# Patient Record
Sex: MALE | Race: WHITE | URBAN - METROPOLITAN AREA
[De-identification: names, ages, dates, MRNs, and addresses within clinical notes are randomized per-mention and may not be internally consistent; named-entity substitution may affect disease eponyms.]

---

## 2017-07-03 ENCOUNTER — TRANSFERRED RECORDS (OUTPATIENT)
Dept: HEALTH INFORMATION MANAGEMENT | Facility: CLINIC | Age: 57
End: 2017-07-03

## 2017-07-11 ENCOUNTER — TRANSFERRED RECORDS (OUTPATIENT)
Dept: HEALTH INFORMATION MANAGEMENT | Facility: CLINIC | Age: 57
End: 2017-07-11

## 2017-07-31 ENCOUNTER — MEDICAL CORRESPONDENCE (OUTPATIENT)
Dept: HEALTH INFORMATION MANAGEMENT | Facility: CLINIC | Age: 57
End: 2017-07-31

## 2017-07-31 NOTE — TELEPHONE ENCOUNTER
1.  Date/reason for appt: 17 - Lung Nodules  2.  Referring provider: Dr Frantz Sue  3.  Call to patient (Yes / No - short description): Yes, scheduled per Loree @ [facility] [phone]  4.  Previous care at / records requested from: [facility]  5.  Recent Imagin17 CT chest, 17 PFT - to be sent from [facility]

## 2017-08-01 NOTE — TELEPHONE ENCOUNTER
8/1/17 Received records from [facility] via fax - copy emailed to Meaghan & sent to scanning.

## 2017-08-25 ENCOUNTER — PRE VISIT (OUTPATIENT)
Dept: PULMONOLOGY | Facility: CLINIC | Age: 57
End: 2017-08-25

## 2017-08-25 ENCOUNTER — OFFICE VISIT (OUTPATIENT)
Dept: PULMONOLOGY | Facility: CLINIC | Age: 57
End: 2017-08-25
Attending: INTERNAL MEDICINE
Payer: COMMERCIAL

## 2017-08-25 VITALS
BODY MASS INDEX: 38.16 KG/M2 | TEMPERATURE: 98.1 F | OXYGEN SATURATION: 100 % | RESPIRATION RATE: 17 BRPM | HEART RATE: 54 BPM | SYSTOLIC BLOOD PRESSURE: 137 MMHG | WEIGHT: 251.8 LBS | DIASTOLIC BLOOD PRESSURE: 83 MMHG | HEIGHT: 68 IN

## 2017-08-25 DIAGNOSIS — R91.8 PULMONARY NODULES: Primary | ICD-10-CM

## 2017-08-25 PROCEDURE — 99212 OFFICE O/P EST SF 10 MIN: CPT | Mod: ZF

## 2017-08-25 ASSESSMENT — PAIN SCALES - GENERAL: PAINLEVEL: SEVERE PAIN (7)

## 2017-08-25 NOTE — MR AVS SNAPSHOT
After Visit Summary   8/25/2017    Deng Oconnell    MRN: [id number]           Patient Information     Date Of Birth          1960        Visit Information        Provider Department      8/25/2017 1:00 PM Jono Fried MD [facility]        Today's Diagnoses     Pulmonary nodules    -  1       Follow-ups after your visit        Follow-up notes from your care team     Return if symptoms worsen or fail to improve.      Your next 10 appointments already scheduled     Sep 22, 2017  4:30 PM CDT   FULL PULMONARY FUNCTION with  PFL D   [facility] Pulmonary Function Testing ([facility])    [address] [place] [address]  3rd [facility] [id number] [phone]            Dec 22, 2017  2:40 PM CST   (Arrive by 2:25 PM)   CT CHEST W/O CONTRAST with UCCT1   [facility] CT ([facility])    [address] [place] [id number] [phone]           Please bring any scans or X-rays taken at other hospitals, if similar tests were done. Also bring a list of your medicines, including vitamins, minerals and over-the-counter drugs. It is safest to leave personal items at home.  Be sure to tell your doctor:   If you have any allergies.   If there s any chance you are pregnant.   If you are breastfeeding.   If you have any special needs.  You do not need to do anything special to prepare.  Please wear loose clothing, such as a sweat suit or jogging clothes. Avoid snaps, zippers and other metal. We may ask you to undress and put on a hospital gown.            Dec 22, 2017  3:30 PM CST   (Arrive by 3:15 PM)   Return Visit with Jono Fried MD   [facility] ([facility])    [address] [place] [address]  2nd [facility] [id number] [phone]              Future tests that were ordered for you today     Open Future Orders        Priority Expected Expires Ordered    CT  "Chest w/o contrast Routine 11/15/2017 9/15/2018 9/15/2017    Pulmonary Function Test Routine  2018            Who to contact     If you have questions or need follow up information about today's clinic visit or your schedule please contact [facility] directly at [phone].  Normal or non-critical lab and imaging results will be communicated to you by MyChart, letter or phone within 4 business days after the clinic has received the results. If you do not hear from us within 7 days, please contact the clinic through [employer] or phone. If you have a critical or abnormal lab result, we will notify you by phone as soon as possible.  Submit refill requests through [employer] or call your pharmacy and they will forward the refill request to us. Please allow 3 business days for your refill to be completed.          Additional Information About Your Visit        [employer] Up Information     [employer] lets you send messages to your doctor, view your test results, renew your prescriptions, schedule appointments and more. To sign up, go to [email or web address] . Click on \"Log in\" on the left side of the screen, which will take you to the Welcome page. Then click on \"Sign up Now\" on the right side of the page.     You will be asked to enter the access code listed below, as well as some personal information. Please follow the directions to create your username and password.     Your access code is: [email or web address]  Expires: 10/29/2017  5:19 PM     Your access code will  in 90 days. If you need help or a new code, please call your [place] clinic or [phone].        Care EveryWhere ID     This is your Care EveryWhere ID. This could be used by other organizations to access your [place] medical records  [id number]        Your Vitals Were     Pulse Temperature Respirations Height Pulse Oximetry BMI (Body Mass Index)    54 98.1  F (36.7  C) (Oral) 17 1.727 m (5' 8\") 100% 38.29 kg/m2       Blood " Pressure from Last 3 Encounters:   09/14/17 142/65   08/25/17 137/83    Weight from Last 3 Encounters:   09/14/17 110.9 kg (244 lb 6.4 oz)   08/25/17 114.2 kg (251 lb 12.8 oz)               Primary Care Provider    Pcp Unknown Verified       No address on file        Equal Access to Services     AMANDA SHIN : Hadii parmjit shirley hadmoraleso Sowilmerali, waaxda luqadaha, qaybta kaalmada carmenkirstenkofi, sangeeta fuentes hayeverardo khourysueasael rodriguez . So [facility] [phone].    ATENCIÓN: Si habla español, tiene a reardon disposición servicios gratuitos de asistencia lingüística. Llame al [phone].    We comply with applicable federal civil rights laws and [place] laws. We do not discriminate on the basis of race, color, national origin, age, disability sex, sexual orientation or gender identity.            Thank you!     Thank you for choosing [facility]  for your care. Our goal is always to provide you with excellent care. Hearing back from our patients is one way we can continue to improve our services. Please take a few minutes to complete the written survey that you may receive in the mail after your visit with us. Thank you!             Your Updated Medication List - Protect others around you: Learn how to safely use, store and throw away your medicines at www.disposemymeds.org.          This list is accurate as of: 8/25/17 11:59 PM.  Always use your most recent med list.                   Brand Name Dispense Instructions for use Diagnosis    IBUPROFEN PO      Take by mouth as needed for moderate pain        METFORMIN HCL PO      Take 500 mg by mouth 2 times daily (with meals)

## 2017-08-25 NOTE — PROGRESS NOTES
[facility]   INTERVENTIONAL PULMONARY CLINIC     Deng Oconnell MRN# [id number]   Age: 57 year old YOB: 1960     Reason for Consultation:     RUL nodule  Requesting Physician:         [facility]          Assessment and Plan:    1. RUL nodule. Due to high risk for lung cancer, the report of 1.3cm RUL nodule is concerning for malignancy. We will ct chest today to evaluate. We will await for outside ct and pft.       Jono Fried MD  Interventional Pulmonary  Division of Pulmonary, Allergy, Critical Care and Sleep Medicine   [phone]            History:   Mr. Oconnell is a 57M with h/o COPD who is here for evaluation of RUL nodule.   - UNfortunately, his records/imaging were not sent to our system  - He had lung cancer screening CT in July demonstrating 1.3cm RUL nodule (no images seen)  - He has a >60pkyr tobacco history quit 2004   - He has dyspnea with exertion but never started on inhaler therapy for COPD   - He denies any new respiratory sx          Past Medical History:   COPD         Past Surgical History:    No past surgical history on file.         Social History:     Social History   Substance Use Topics     Smoking status: Former Smoker     Quit date: 8/25/2005     Smokeless tobacco: Never Used     Alcohol use Not on file            Family History:   No family history on file.          Allergies:   Please see allergy list which was reviewed this admission.         Medications:     .  Current Outpatient Prescriptions   Medication     METFORMIN HCL PO     IBUPROFEN PO     No current facility-administered medications for this visit.               Review of Systems:   CONSTITUTIONAL: negative for fever, chills, change in weight  INTEGUMENTARY/SKIN: no rash or obvious new lesions  ENT/MOUTH: no sore throat, new sinus pain or nasal drainage  RESP: see interval history  CV: negative for chest pain, palpitations or peripheral edema  GI: no nausea, vomiting, change in stools  : no  dysuria  MUSCULOSKELETAL: no myalgias, arthralgias  ENDOCRINE: blood sugars with adequate control  PSYCHIATRIC: mood stable  LYMPHATIC: no new lymphadenopathy  HEME: no bleeding or easy bruisability  NEURO: no numbness, weakness, headaches         Physical Exam:   Temp:  [98.1  F (36.7  C)] 98.1  F (36.7  C)  Pulse:  [54] 54  Resp:  [17] 17  BP: (137)/(83) 137/83  SpO2:  [100 %] 100 %    Wt Readings from Last 4 Encounters:   08/25/17 114.2 kg (251 lb 12.8 oz)       Constitutional:   Awake, alert and in no apparent distress     Eyes:   Nonicteric, RAJAN     ENT:    oral mucosa moist without lesions     Neck:   Supple without supraclavicular or cervical lymphadenopathy     Lungs:   Good air flow.  No crackles. No rhonchi.  No wheezes.     Cardiovascular:   Normal S1 and S2.  RRR.  No murmur, gallop or rub.  Radial, DP and PT pulses normal and symmetric     Abdomen:   NABS, soft, nontender, nondistended.  No HSM.     Musculoskeletal:   No edema. Strength 5/5 and symmetric     Neurologic:   Alert and conversant. Cranial nerves II-XII intact.       Skin:   Warm, dry.  No rash on limited exam.           Current Laboratory Data:   No data to review

## 2017-08-25 NOTE — LETTER
Date:August 28, 2017      Patient was self referred, no letter generated. Do not send.        [facility] Health Information

## 2017-08-25 NOTE — PROGRESS NOTES
CT chest demonstrated 1.3-1.4cm nodule in RUL.   - no sig mediastinal LAD   - plan to get PET scan and PFT's next wk and discuss in nodule conference next friday.

## 2017-08-29 NOTE — TELEPHONE ENCOUNTER
8/29/17 - Called referring clinic to check status of imaging disc. Per Юлия imaging disc was sent to us via [employer] this morning & should arrive tomorrow afternoon.    8/28/17 - Called referring clinic to confirm imaging disc has been sent to us, left voicemail for Loree ([phone]) & also on main line ([phone]).    8/25/17 - Called referring clinic & requested 2nd copy of imaging disc - left message on voicemail for Loree ([phone])       Also left message on main clinic voicemail ([phone]), and faxed request to [phone] & [phone].    8/25/17 - Called Jun in film room re: imaging disc/no uploaded imaging in system. Per Jun unable to upload imaging due to error on disc.

## 2017-08-30 NOTE — TELEPHONE ENCOUNTER
8/30/17 - received imaging disc from [employer] & delivered it to [facility] film room. Also received imaging report, copy sent to Meaghan via email & to HIM for scanning.

## 2017-09-01 ENCOUNTER — DOCUMENTATION ONLY (OUTPATIENT)
Dept: OTHER | Facility: CLINIC | Age: 57
End: 2017-09-01

## 2017-09-01 NOTE — PROGRESS NOTES
Called Mr. Oconnell today however could only leave a voicemail.   - I reported that the consensus of the nodule conference was to wait for PET and then discuss case at tumor conference next wk.     Jono Fried MD  Interventional Pulmonary  Division of Pulmonary, Allergy, Critical Care and Sleep Medicine   [phone]

## 2017-09-05 ENCOUNTER — HOSPITAL ENCOUNTER (OUTPATIENT)
Dept: PET IMAGING | Facility: CLINIC | Age: 57
Discharge: HOME | End: 2017-09-05
Attending: INTERNAL MEDICINE | Admitting: INTERNAL MEDICINE
Payer: COMMERCIAL

## 2017-09-05 ENCOUNTER — DOCUMENTATION ONLY (OUTPATIENT)
Dept: OTHER | Facility: CLINIC | Age: 57
End: 2017-09-05

## 2017-09-05 ENCOUNTER — HOSPITAL ENCOUNTER (OUTPATIENT)
Dept: PET IMAGING | Facility: CLINIC | Age: 57
End: 2017-09-05
Attending: INTERNAL MEDICINE
Payer: COMMERCIAL

## 2017-09-05 DIAGNOSIS — R91.8 PULMONARY NODULES: ICD-10-CM

## 2017-09-05 LAB — GLUCOSE BLDC GLUCOMTR-MCNC: 108 MG/DL (ref 70–99)

## 2017-09-05 PROCEDURE — 34300033 ZZH RX 343: Performed by: INTERNAL MEDICINE

## 2017-09-05 PROCEDURE — 25000128 H RX IP 250 OP 636: Performed by: INTERNAL MEDICINE

## 2017-09-05 PROCEDURE — 82962 GLUCOSE BLOOD TEST: CPT

## 2017-09-05 PROCEDURE — 71260 CT THORAX DX C+: CPT

## 2017-09-05 PROCEDURE — 74177 CT ABD & PELVIS W/CONTRAST: CPT

## 2017-09-05 PROCEDURE — 70491 CT SOFT TISSUE NECK W/DYE: CPT

## 2017-09-05 PROCEDURE — A9552 F18 FDG: HCPCS | Performed by: INTERNAL MEDICINE

## 2017-09-05 RX ORDER — IOPAMIDOL 755 MG/ML
60-135 INJECTION, SOLUTION INTRAVASCULAR ONCE
Status: COMPLETED | OUTPATIENT
Start: 2017-09-05 | End: 2017-09-05

## 2017-09-05 RX ADMIN — FLUDEOXYGLUCOSE F-18 15.9 MCI.: 500 INJECTION, SOLUTION INTRAVENOUS at 07:01

## 2017-09-05 RX ADMIN — IOPAMIDOL 135 ML: 755 INJECTION, SOLUTION INTRAVENOUS at 08:12

## 2017-09-05 NOTE — PROGRESS NOTES
Called Mr. Oconnell today however only able to leave a voicemail.   Discussed that the tumor conference consensus is to proceed to evaluation by thoracic surgery.     Jono Fried MD  Interventional Pulmonary  Division of Pulmonary, Allergy, Critical Care and Sleep Medicine   [phone]

## 2017-09-13 NOTE — PROGRESS NOTES
THORACIC SURGERY - NEW PATIENT OFFICE VISIT      Dear Dr. Fried,    I saw Mr. Oconnell at Dr. Fried s request in consultation for the evaluation and treatment of a RIGHT upper lobe spiculated nodule.     HPI  Mr. Deng Oconnell is a 57 year old year-old male who was found to have a 1.4cm RIGHT upper lobe spiculated nodule on a screening CT chest due to his history of smoking. He denies any respiratory complaints, no chest pain, SOB, cough or hemoptysis.    Previsit Tests   PET-CT 9/5/17: 1.1cm RIGHT upper lobe nodule with mild FDG uptake (2.5), bilateral adrenal thickening with FDG uptake (6), possibly hyperplasia          PFT 7/3/2017: FEV1: 3.36  PMH  DM    No past medical history on file.     PSH  Knee surgery    No past surgical history on file.     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 He is single and has no children. His sister lives nearby in [place]    IMPRESSION (R91.1) Lung nodule  (primary encounter diagnosis)       57 year old year-old male with RIGHT upper lobe solitary pulmonary nodule    PLAN  I spent a total of 30 minutes with Mr. Oconnell, more than 50% of which were spent in counseling, coordination of care, and face-to-face time. I reviewed the plan as follows:  Discussed with the patient and his sister who was on the phone regarding the options of surgical biopsy vs. Waiting.  Surgical biopsy would involve a VATS possible thoracotomy with a wedge and potentially a lobectomy given the location of the lesion. We went over the possibility of a lobectomy for a benign diagnosis. Patient is skeptical about this.  We also went over the possibility of a short term follow up but with the associated risk of leaving a cancer under observation.  He is open to the idea of surgery. However, we will discuss him at our nodule conference to come to a  rich on proceeding with surgery  All questions were answered and Deng Oconnell and present family were in agreement with the plan.  I appreciate the opportunity to participate in the care of your patient and will keep you updated.  Sincerely,

## 2017-09-14 ENCOUNTER — OFFICE VISIT (OUTPATIENT)
Dept: SURGERY | Facility: CLINIC | Age: 57
End: 2017-09-14
Attending: INTERNAL MEDICINE
Payer: COMMERCIAL

## 2017-09-14 VITALS
HEART RATE: 67 BPM | BODY MASS INDEX: 37.04 KG/M2 | HEIGHT: 68 IN | SYSTOLIC BLOOD PRESSURE: 142 MMHG | TEMPERATURE: 98.3 F | RESPIRATION RATE: 17 BRPM | WEIGHT: 244.4 LBS | OXYGEN SATURATION: 95 % | DIASTOLIC BLOOD PRESSURE: 65 MMHG

## 2017-09-14 DIAGNOSIS — R91.1 LUNG NODULE: Primary | ICD-10-CM

## 2017-09-14 PROCEDURE — 99212 OFFICE O/P EST SF 10 MIN: CPT | Mod: ZF

## 2017-09-14 PROCEDURE — 99213 OFFICE O/P EST LOW 20 MIN: CPT

## 2017-09-14 ASSESSMENT — PAIN SCALES - GENERAL: PAINLEVEL: NO PAIN (0)

## 2017-09-14 NOTE — LETTER
9/14/2017       RE: Deng Oconnell  [address] [facility] [address]     Dear Colleague,    Thank you for referring your patient, Deng Oconnell, to the [facility]. Please see a copy of my visit note below.    THORACIC SURGERY - NEW PATIENT OFFICE VISIT      Dear Dr. Fried,    I saw Mr. Oconnell at Dr. Fried s request in consultation for the evaluation and treatment of a RIGHT upper lobe spiculated nodule.     HPI  Mr. Deng Oconnell is a 57 year old year-old male who was found to have a 1.4cm RIGHT upper lobe spiculated nodule on a screening CT chest due to his history of smoking. He denies any respiratory complaints, no chest pain, SOB, cough or hemoptysis.    Previsit Tests   PET-CT 9/5/17: 1.1cm RIGHT upper lobe nodule with mild FDG uptake (2.5), bilateral adrenal thickening with FDG uptake (6), possibly hyperplasia          PFT 7/3/2017: FEV1: 3.36  PMH  DM    No past medical history on file.     PSH  Knee surgery    No past surgical history on file.     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 He is single and has no children. His sister lives nearby in [place]    IMPRESSION (R91.1) Lung nodule  (primary encounter diagnosis)       57 year old year-old male with RIGHT upper lobe solitary pulmonary nodule    PLAN  I spent a total of 30 minutes with Mr. Oconnell, more than 50% of which were spent in counseling, coordination of care, and face-to-face time. I reviewed the plan as follows:  Discussed with the patient and his sister who was on the phone regarding the options of surgical biopsy vs. Waiting.  Surgical biopsy would involve a VATS possible thoracotomy with a wedge and potentially a lobectomy given the location of the lesion. We went over the possibility of a lobectomy for a benign diagnosis. Patient is skeptical about this.  We also  went over the possibility of a short term follow up but with the associated risk of leaving a cancer under observation.  He is open to the idea of surgery. However, we will discuss him at our nodule conference to come to a concensus on proceeding with surgery  All questions were answered and Deng Oconnell and present family were in agreement with the plan.  I appreciate the opportunity to participate in the care of your patient and will keep you updated.  Sincerely,        Estefany Main MD

## 2017-09-14 NOTE — NURSING NOTE
"Oncology Rooming Note    September 14, 2017 9:18 AM   Deng Oconnell is a 57 year old male who presents for:    Chief Complaint   Patient presents with     Oncology Clinic Visit     new patient visit for consultation related to RUL spiculated nodule     Initial Vitals: /65  Pulse 67  Temp 98.3  F (36.8  C) (Oral)  Resp 17  Ht 1.727 m (5' 8\")  Wt 110.9 kg (244 lb 6.4 oz)  SpO2 95%  BMI 37.16 kg/m2 Estimated body mass index is 37.16 kg/(m^2) as calculated from the following:    Height as of this encounter: 1.727 m (5' 8\").    Weight as of this encounter: 110.9 kg (244 lb 6.4 oz). Body surface area is 2.31 meters squared.  No Pain (0) Comment: Data Unavailable   No LMP for male patient.  Allergies reviewed: Yes  Medications reviewed: Yes    Medications: Medication refills not needed today.  Pharmacy name entered into EPIC: Data Unavailable    Clinical concerns: no concerns dr. wolfe was notified.    8 minutes for nursing intake (face to face time)     Magno Manjarrez CMA                "

## 2017-09-14 NOTE — MR AVS SNAPSHOT
"              After Visit Summary   9/14/2017    Deng Oconnell    MRN: [id number]           Patient Information     Date Of Birth          1960        Visit Information        Provider Department      9/14/2017 9:30 AM Estefany Main MD [facility]        Today's Diagnoses     Lung nodule    -  1       Follow-ups after your visit        Your next 10 appointments already scheduled     Sep 22, 2017  4:30 PM CDT   FULL PULMONARY FUNCTION with UC PFL D   [facility] Pulmonary Function Testing ([facility])    [address] [facility]  3rd [facility] [address] [phone]              Future tests that were ordered for you today     Open Future Orders        Priority Expected Expires Ordered    Pulmonary Function Test Routine  9/14/2018 9/14/2017            Who to contact     If you have questions or need follow up information about today's clinic visit or your schedule please contact [facility] directly at [phone].  Normal or non-critical lab and imaging results will be communicated to you by [employer], letter or phone within 4 business days after the clinic has received the results. If you do not hear from us within 7 days, please contact the clinic through [employer] or phone. If you have a critical or abnormal lab result, we will notify you by phone as soon as possible.  Submit refill requests through [employer] or call your pharmacy and they will forward the refill request to us. Please allow 3 business days for your refill to be completed.          Additional Information About Your Visit        [employer] Information     [employer] lets you send messages to your doctor, view your test results, renew your prescriptions, schedule appointments and more. To sign up, go to [email or web address] . Click on \"Log in\" on the left side of the screen, which will take you to the Welcome page. Then click on \"Sign up Now\" on the right side of the page.     You " "will be asked to enter the access code listed below, as well as some personal information. Please follow the directions to create your username and password.     Your access code is: [email or web address]  Expires: 10/29/2017  5:19 PM     Your access code will  in 90 days. If you need help or a new code, please call your [facility] or [phone].        Care EveryWhere ID     This is your Care EveryWhere ID. This could be used by other organizations to access your [facility] medical records  [id number]        Your Vitals Were     Pulse Temperature Respirations Height Pulse Oximetry BMI (Body Mass Index)    67 98.3  F (36.8  C) (Oral) 17 1.727 m (5' 8\") 95% 37.16 kg/m2       Blood Pressure from Last 3 Encounters:   17 142/65   17 137/83    Weight from Last 3 Encounters:   17 110.9 kg (244 lb 6.4 oz)   17 114.2 kg (251 lb 12.8 oz)               Primary Care Provider    Pcp Unknown Verified       No address on file        Equal Access to Services     [facility] : Hadii parmjit lopez Socamden, waaxda lusarabjit, qaybta kaalcaitlyn barry, sangeeta kerr. So [facility] [phone].    ATENCIÓN: Si habla español, tiene a reardon disposición servicios gratuitos de asistencia lingüística. Ll[facility] [phone].    We comply with applicable federal civil rights laws and [place] laws. We do not discriminate on the basis of race, color, national origin, age, disability sex, sexual orientation or gender identity.            Thank you!     Thank you for choosing [facility]  for your care. Our goal is always to provide you with excellent care. Hearing back from our patients is one way we can continue to improve our services. Please take a few minutes to complete the written survey that you may receive in the mail after your visit with us. Thank you!             Your Updated Medication List - Protect others around you: Learn how to safely use, store and throw away " your medicines at www.disposemymeds.org.          This list is accurate as of: 9/14/17 10:46 AM.  Always use your most recent med list.                   Brand Name Dispense Instructions for use Diagnosis    IBUPROFEN PO      Take by mouth as needed for moderate pain        METFORMIN HCL PO      Take 500 mg by mouth 2 times daily (with meals)

## 2017-09-15 ENCOUNTER — DOCUMENTATION ONLY (OUTPATIENT)
Dept: OTHER | Facility: CLINIC | Age: 57
End: 2017-09-15

## 2017-09-15 ENCOUNTER — TELEPHONE (OUTPATIENT)
Dept: SURGERY | Facility: CLINIC | Age: 57
End: 2017-09-15

## 2017-09-15 DIAGNOSIS — R91.8 PULMONARY NODULES: Primary | ICD-10-CM

## 2017-09-15 DIAGNOSIS — R91.1 LUNG NODULE: Primary | ICD-10-CM

## 2017-09-15 NOTE — PROGRESS NOTES
Called  Kourtney today and left message.   - Essentially thoracic surgery prefers to perform another short-term CT in 3months to re-evaluate nodule.   - The consensus is in agreement.   - He will get 3mo f/u with me in nodule clinic with ct same day.    Jono Fried MD  Interventional Pulmonary  Division of Pulmonary, Allergy, Critical Care and Sleep Medicine   [phone]

## 2017-09-15 NOTE — TELEPHONE ENCOUNTER
Call to Deng to discuss the recommendations from this morning's conference. There was no answer. Message left with call back information.

## 2018-01-08 ENCOUNTER — OFFICE VISIT (OUTPATIENT)
Dept: PULMONOLOGY | Facility: CLINIC | Age: 58
End: 2018-01-08
Attending: INTERNAL MEDICINE
Payer: COMMERCIAL

## 2018-01-08 ENCOUNTER — RADIANT APPOINTMENT (OUTPATIENT)
Dept: CT IMAGING | Facility: CLINIC | Age: 58
End: 2018-01-08
Attending: INTERNAL MEDICINE
Payer: COMMERCIAL

## 2018-01-08 VITALS
WEIGHT: 233.03 LBS | TEMPERATURE: 97.2 F | DIASTOLIC BLOOD PRESSURE: 75 MMHG | OXYGEN SATURATION: 95 % | BODY MASS INDEX: 35.43 KG/M2 | RESPIRATION RATE: 16 BRPM | SYSTOLIC BLOOD PRESSURE: 128 MMHG | HEART RATE: 71 BPM

## 2018-01-08 DIAGNOSIS — R91.8 PULMONARY NODULES: Primary | ICD-10-CM

## 2018-01-08 DIAGNOSIS — R91.8 PULMONARY NODULES: ICD-10-CM

## 2018-01-08 RX ORDER — METOPROLOL TARTRATE 25 MG/1
25 TABLET, FILM COATED ORAL DAILY
COMMUNITY
End: 2018-02-22

## 2018-01-08 RX ORDER — METOPROLOL SUCCINATE 25 MG/1
25 TABLET, EXTENDED RELEASE ORAL EVERY MORNING
COMMUNITY
Start: 2017-11-13

## 2018-01-08 ASSESSMENT — PAIN SCALES - GENERAL: PAINLEVEL: NO PAIN (0)

## 2018-01-08 NOTE — MR AVS SNAPSHOT
"              After Visit Summary   2018    Deng Oconnell    MRN: [id number]           Patient Information     Date Of Birth          1960        Visit Information        Provider Department      2018 4:30 PM Jono Fried MD [facility]        Today's Diagnoses     Pulmonary nodules    -  1       Follow-ups after your visit        Follow-up notes from your care team     Return if symptoms worsen or fail to improve.      Who to contact     If you have questions or need follow up information about today's clinic visit or your schedule please contact [facility] directly at [phone].  Normal or non-critical lab and imaging results will be communicated to you by MyChart, letter or phone within 4 business days after the clinic has received the results. If you do not hear from us within 7 days, please contact the clinic through eyesFinderhart or phone. If you have a critical or abnormal lab result, we will notify you by phone as soon as possible.  Submit refill requests through [facility] or call your pharmacy and they will forward the refill request to us. Please allow 3 business days for your refill to be completed.          Additional Information About Your Visit        MyChart Information     [facility]ita lets you send messages to your doctor, view your test results, renew your prescriptions, schedule appointments and more. To sign up, go to [email or web address] . Click on \"Log in\" on the left side of the screen, which will take you to the Welcome page. Then click on \"Sign up Now\" on the right side of the page.     You will be asked to enter the access code listed below, as well as some personal information. Please follow the directions to create your username and password.     Your access code is: [email or web address]  Expires: 2018 11:12 AM     Your access code will  in 90 days. If you need help or a new code, please call your [place] clinic or [phone].        Care " EveryWhere ID     This is your Care EveryWhere ID. This could be used by other organizations to access your [place] medical records  [id number]        Your Vitals Were     Pulse Temperature Respirations Pulse Oximetry BMI (Body Mass Index)       71 97.2  F (36.2  C) (Oral) 16 95% 35.43 kg/m2        Blood Pressure from Last 3 Encounters:   01/08/18 128/75   09/14/17 142/65   08/25/17 137/83    Weight from Last 3 Encounters:   01/08/18 105.7 kg (233 lb 0.4 oz)   09/14/17 110.9 kg (244 lb 6.4 oz)   08/25/17 114.2 kg (251 lb 12.8 oz)              Today, you had the following     No orders found for display         Today's Medication Changes          These changes are accurate as of: 1/8/18  7:08 PM.  If you have any questions, ask your nurse or doctor.               Stop taking these medicines if you haven't already. Please contact your care team if you have questions.     METFORMIN HCL PO   Stopped by:  Jono Fried MD                    Primary Care Provider Fax #    Physician No Ref-Primary [phone]       No address on file        Equal Access to Services     [facility]: Hadrodolfo Gmable, elisa cueto, qahawa barry, sangeeta rodriguez . So [facility] [phone].    ATENCIÓN: Si habla español, tiene a reardon disposición servicios gratuitos de asistencia lingüística. [facility] [phone].    We comply with applicable federal civil rights laws and [place] laws. We do not discriminate on the basis of race, color, national origin, age, disability, sex, sexual orientation, or gender identity.            Thank you!     Thank you for choosing [facility]  for your care. Our goal is always to provide you with excellent care. Hearing back from our patients is one way we can continue to improve our services. Please take a few minutes to complete the written survey that you may receive in the mail after your visit with us. Thank you!             Your  Updated Medication List - Protect others around you: Learn how to safely use, store and throw away your medicines at www.disposemymeds.org.          This list is accurate as of: 1/8/18  7:08 PM.  Always use your most recent med list.                   Brand Name Dispense Instructions for use Diagnosis    ASPIRIN PO      Take 81 mg by mouth daily        metoprolol 25 MG 24 hr tablet    TOPROL-XL     Take 25 mg by mouth        metoprolol 25 MG tablet    LOPRESSOR     Take 25 mg by mouth daily

## 2018-01-08 NOTE — NURSING NOTE
"Oncology Rooming Note    January 8, 2018 3:30 PM   Deng Oconnell is a 57 year old male who presents for:    No chief complaint on file.    Initial Vitals: /75  Pulse 71  Temp 97.2  F (36.2  C) (Oral)  Resp 16  Wt 105.7 kg (233 lb 0.4 oz)  SpO2 95%  BMI 35.43 kg/m2 Estimated body mass index is 35.43 kg/(m^2) as calculated from the following:    Height as of 9/14/17: 1.727 m (5' 8\").    Weight as of this encounter: 105.7 kg (233 lb 0.4 oz). Body surface area is 2.25 meters squared.  No Pain (0) Comment: Data Unavailable   No LMP for male patient.  Allergies reviewed: Yes  Medications reviewed: Yes    Medications: Medication refills not needed today.  Pharmacy name entered into EPIC: Data Unavailable    Clinical concerns: results  Cho was notified.    7  minutes for nursing intake (face to face time)     Anaya Shabazz MA                "

## 2018-01-08 NOTE — LETTER
1/8/2018       RE: Deng Oconnell  [address] [address]     Dear Colleague,    Thank you for referring your patient, Deng Oconnell, to the [facility]. Please see a copy of my visit note below.    [facility]   PULMONARY/INTERVENTIONAL CLINIC  [facility] SURGERY [place]     Deng Oconnell MRN# [id number]   Age: 57 year old YOB: 1960     Requesting Physician:         Provider Not In System     Assessment and Plan:    1. RUL nodule. Although this is indeterminate; the probability that this is malignancy is >90% given size, pretest probability and SUV>3.5. I will discuss this case with thoracic surgery at lung nodule conference this Friday and call him with the consensus. He is agreeable.    I spent more than 30 minutes face to face and greater than 50% of time was for counseling and coordination of care about the issues above.    Jono Fried MD  Interventional Pulmonary  Division of Pulmonary, Allergy, Critical Care and Sleep Medicine   [phone]            History:   Mr. Oconnell is a 57M with right lung nodule back for f/u.   - No new respiratory sx. Quit tobacco ~15yrs ago.   - Has a 1.1 cm RUL nodule that has been w/u with PET and discussed at nodule conference. The SUV is >3.5. Recently saw thoracic surgery and given that this would be an extensive procedure, plan was to f/u another short-term CT.   - He denies CP, dyspnea, hemoptysis.            Past Medical History:   Lung nodule         Past Surgical History:    No past surgical history on file.         Social History:     Social History   Substance Use Topics     Smoking status: Former Smoker     Quit date: 8/25/2005     Smokeless tobacco: Never Used     Alcohol use Not on file            Family History:   No family history on file.          Allergies:   Please see allergy list which was reviewed this admission.         Medications:     Current  Outpatient Prescriptions   Medication     metoprolol (LOPRESSOR) 25 MG tablet     ASPIRIN PO     metoprolol (TOPROL-XL) 25 MG 24 hr tablet     No current facility-administered medications for this visit.           Review of Systems:   CONSTITUTIONAL: negative for fever, chi, change in weight  INTEGUMENTARY/SKIN: no rash or obvious new lesions  ENT/MOUTH: no sore throat, new sinus pain or nasal drainage  RESP: see interval history  CV: negative for chest pain, palpitations or peripheral edema  GI: no nausea, vomiting, change in stools  : no dysuria  MUSCULOSKELETAL: no myalgias, arthralgias  ENDOCRINE: blood sugars with adequate control  PSYCHIATRIC: mood stable  LYMPHATIC: no new lymphadenopathy  HEME: no bleeding or easy bruisability  NEURO: no numbness, weakness, headaches         Physical Exam:   Temp:  [97.2  F (36.2  C)] 97.2  F (36.2  C)  Pulse:  [71] 71  Resp:  [16] 16  BP: (128)/(75) 128/75  SpO2:  [95 %] 95 %  [unfilled]  Wt Readings from Last 4 Encounters:   01/08/18 105.7 kg (233 lb 0.4 oz)   09/14/17 110.9 kg (244 lb 6.4 oz)   08/25/17 114.2 kg (251 lb 12.8 oz)       Constitutional:   Awake, alert and in no apparent distress     Eyes:   Nonicteric, RAJAN     ENT:    oral mucosa moist without lesions     Neck:   Supple without supraclavicular or cervical lymphadenopathy     Lungs:   Good air flow.  No crackles. No rhonchi.  No wheezes.     Cardiovascular:   Normal S1 and S2.  RRR.  No murmur, gallop or rub.  Radial, DP and PT pulses normal and symmetric     Abdomen:   NABS, soft, nontender, nondistended.  No HSM.     Musculoskeletal:   No edema. Strength 5/5 and symmetric     Neurologic:   Alert and conversant. Cranial nerves II-XII intact.       Skin:   Warm, dry.  No rash on limited exam.           Current Laboratory Data:   All laboratory and imaging data reviewed.    Results for orders placed or performed in visit on 01/08/18 (from the past 24 hour(s))   CT Chest w/o contrast    Narrative    Exam: CT  chest with IV contrast 1/8/2018 3:35 PM    History: Nodule; Pulmonary nodules    Comparison: 9/5/2017, 7/11/2017    Technique: Helical CT imaging from the thoracic inlet through the  diaphragms without IV contrast. Multiplanar reconstructions including  axial and coronal. Images were reviewed in bone, soft tissue, and lung  windows.     Findings:    Chest:  The thyroid parenchyma, heart size, pericardium, and esophagus are  unremarkable. Common origin of the right brachiocephalic and left  common carotid arteries. The ascending aorta and main pulmonary artery  are not enlarged. No lymphadenopathy in the chest.    The central tracheal bronchial tree is patent. No pneumothorax,  pleural effusion, or consolidation. Stable 1.1 cm right upper lobe  pulmonary nodule (series 4, image 129). Stable 2 mm nodule along the  left fissure (series 4, image 143). Scattered calcified granulomas. No  new pulmonary nodules.    Upper abdomen:  Tiny splenule anterior to the spleen. The upper abdomen is otherwise  unremarkable.    Bones:  No acute or worrisome osseous lesions.        Impression    Impression:   Stable 1.1 cm right upper lobe pulmonary nodule, unchanged since  7/11/2017. No new pulmonary nodules. Consider follow up in 6 months  with low dose chest CT.    I have personally reviewed the examination and initial interpretation  and I agree with the findings.    CALE BATRES MD             Previous Chest Imaging   Exam: CT chest with IV contrast 1/8/2018 3:35 PM     History: Nodule; Pulmonary nodules     Comparison: 9/5/2017, 7/11/2017     Technique: Helical CT imaging from the thoracic inlet through the  diaphragms without IV contrast. Multiplanar reconstructions including  axial and coronal. Images were reviewed in bone, soft tissue, and lung  windows.      Findings:     Chest:  The thyroid parenchyma, heart size, pericardium, and esophagus are  unremarkable. Common origin of the right brachiocephalic and left  common  carotid arteries. The ascending aorta and main pulmonary artery  are not enlarged. No lymphadenopathy in the chest.     The central tracheal bronchial tree is patent. No pneumothorax,  pleural effusion, or consolidation. Stable 1.1 cm right upper lobe  pulmonary nodule (series 4, image 129). Stable 2 mm nodule along the  left fissure (series 4, image 143). Scattered calcified granulomas. No  new pulmonary nodules.     Upper abdomen:  Tiny splenule anterior to the spleen. The upper abdomen is otherwise  unremarkable.     Bones:  No acute or worrisome osseous lesions.            Impression:   Stable 1.1 cm right upper lobe pulmonary nodule, unchanged since  7/11/2017. No new pulmonary nodules. Consider follow up in 6 months  with low dose chest CT.     I have personally reviewed the examination and initial interpretation  and I agree with the findings.     CALE BATRES MD               Again, thank you for allowing me to participate in the care of your patient.      Sincerely,    Jono Fried MD

## 2018-01-08 NOTE — LETTER
Date:January 9, 2018      Patient was self referred, no letter generated. Do not send.        [facility] Health Information

## 2018-01-09 NOTE — PROGRESS NOTES
-[facility]   PULMONARY/INTERVENTIONAL CLINIC  [facility] & SURGERY [place]     Deng Oconnell MRN# [id number]   Age: 57 year old YOB: 1960     Requesting Physician:         Provider Not In System     Assessment and Plan:    1. RUL nodule. Although this is indeterminate; the probability that this is malignancy is >90% given size, pretest probability and SUV>2.5. I will discuss this case with thoracic surgery at lung nodule conference this Friday and call him with the consensus. He is agreeable.    I spent more than 30 minutes face to face and greater than 50% of time was for counseling and coordination of care about the issues above.    Jono Fried MD  Interventional Pulmonary  Division of Pulmonary, Allergy, Critical Care and Sleep Medicine   [phone]            History:   Mr. Oconnell is a 57M with right lung nodule back for f/u.   - No new respiratory sx. Quit tobacco ~15yrs ago.   - Has a 1.1 cm RUL nodule that has been w/u with PET and discussed at nodule conference. The SUV is >3.5. Recently saw thoracic surgery and given that this would be an extensive procedure, plan was to f/u another short-term CT.   - He denies CP, dyspnea, hemoptysis.            Past Medical History:   Lung nodule         Past Surgical History:    No past surgical history on file.         Social History:     Social History   Substance Use Topics     Smoking status: Former Smoker     Quit date: 8/25/2005     Smokeless tobacco: Never Used     Alcohol use Not on file            Family History:   No family history on file.          Allergies:   Please see allergy list which was reviewed this admission.         Medications:     Current Outpatient Prescriptions   Medication     metoprolol (LOPRESSOR) 25 MG tablet     ASPIRIN PO     metoprolol (TOPROL-XL) 25 MG 24 hr tablet     No current facility-administered medications for this visit.           Review of Systems:   CONSTITUTIONAL: negative for fever, chi,  change in weight  INTEGUMENTARY/SKIN: no rash or obvious new lesions  ENT/MOUTH: no sore throat, new sinus pain or nasal drainage  RESP: see interval history  CV: negative for chest pain, palpitations or peripheral edema  GI: no nausea, vomiting, change in stools  : no dysuria  MUSCULOSKELETAL: no myalgias, arthralgias  ENDOCRINE: blood sugars with adequate control  PSYCHIATRIC: mood stable  LYMPHATIC: no new lymphadenopathy  HEME: no bleeding or easy bruisability  NEURO: no numbness, weakness, headaches         Physical Exam:   Temp:  [97.2  F (36.2  C)] 97.2  F (36.2  C)  Pulse:  [71] 71  Resp:  [16] 16  BP: (128)/(75) 128/75  SpO2:  [95 %] 95 %  [unfilled]  Wt Readings from Last 4 Encounters:   01/08/18 105.7 kg (233 lb 0.4 oz)   09/14/17 110.9 kg (244 lb 6.4 oz)   08/25/17 114.2 kg (251 lb 12.8 oz)       Constitutional:   Awake, alert and in no apparent distress     Eyes:   Nonicteric, RAJAN     ENT:    oral mucosa moist without lesions     Neck:   Supple without supraclavicular or cervical lymphadenopathy     Lungs:   Good air flow.  No crackles. No rhonchi.  No wheezes.     Cardiovascular:   Normal S1 and S2.  RRR.  No murmur, gallop or rub.  Radial, DP and PT pulses normal and symmetric     Abdomen:   NABS, soft, nontender, nondistended.  No HSM.     Musculoskeletal:   No edema. Strength 5/5 and symmetric     Neurologic:   Alert and conversant. Cranial nerves II-XII intact.       Skin:   Warm, dry.  No rash on limited exam.           Current Laboratory Data:   All laboratory and imaging data reviewed.    Results for orders placed or performed in visit on 01/08/18 (from the past 24 hour(s))   CT Chest w/o contrast    Narrative    Exam: CT chest with IV contrast 1/8/2018 3:35 PM    History: Nodule; Pulmonary nodules    Comparison: 9/5/2017, 7/11/2017    Technique: Helical CT imaging from the thoracic inlet through the  diaphragms without IV contrast. Multiplanar reconstructions including  axial and coronal.  Images were reviewed in bone, soft tissue, and lung  windows.     Findings:    Chest:  The thyroid parenchyma, heart size, pericardium, and esophagus are  unremarkable. Common origin of the right brachiocephalic and left  common carotid arteries. The ascending aorta and main pulmonary artery  are not enlarged. No lymphadenopathy in the chest.    The central tracheal bronchial tree is patent. No pneumothorax,  pleural effusion, or consolidation. Stable 1.1 cm right upper lobe  pulmonary nodule (series 4, image 129). Stable 2 mm nodule along the  left fissure (series 4, image 143). Scattered calcified granulomas. No  new pulmonary nodules.    Upper abdomen:  Tiny splenule anterior to the spleen. The upper abdomen is otherwise  unremarkable.    Bones:  No acute or worrisome osseous lesions.        Impression    Impression:   Stable 1.1 cm right upper lobe pulmonary nodule, unchanged since  7/11/2017. No new pulmonary nodules. Consider follow up in 6 months  with low dose chest CT.    I have personally reviewed the examination and initial interpretation  and I agree with the findings.    CALE BATRES MD             Previous Chest Imaging   Exam: CT chest with IV contrast 1/8/2018 3:35 PM     History: Nodule; Pulmonary nodules     Comparison: 9/5/2017, 7/11/2017     Technique: Helical CT imaging from the thoracic inlet through the  diaphragms without IV contrast. Multiplanar reconstructions including  axial and coronal. Images were reviewed in bone, soft tissue, and lung  windows.      Findings:     Chest:  The thyroid parenchyma, heart size, pericardium, and esophagus are  unremarkable. Common origin of the right brachiocephalic and left  common carotid arteries. The ascending aorta and main pulmonary artery  are not enlarged. No lymphadenopathy in the chest.     The central tracheal bronchial tree is patent. No pneumothorax,  pleural effusion, or consolidation. Stable 1.1 cm right upper lobe  pulmonary nodule (series  4, image 129). Stable 2 mm nodule along the  left fissure (series 4, image 143). Scattered calcified granulomas. No  new pulmonary nodules.     Upper abdomen:  Tiny splenule anterior to the spleen. The upper abdomen is otherwise  unremarkable.     Bones:  No acute or worrisome osseous lesions.            Impression:   Stable 1.1 cm right upper lobe pulmonary nodule, unchanged since  7/11/2017. No new pulmonary nodules. Consider follow up in 6 months  with low dose chest CT.     I have personally reviewed the examination and initial interpretation  and I agree with the findings.     CALE BATRES MD

## 2018-01-12 ENCOUNTER — DOCUMENTATION ONLY (OUTPATIENT)
Dept: OTHER | Facility: CLINIC | Age: 58
End: 2018-01-12

## 2018-01-12 DIAGNOSIS — R91.1 LUNG NODULE: Primary | ICD-10-CM

## 2018-01-17 ENCOUNTER — HOSPITAL ENCOUNTER (INPATIENT)
Facility: CLINIC | Age: 58
Setting detail: SURGERY ADMIT
End: 2018-01-17
Attending: STUDENT IN AN ORGANIZED HEALTH CARE EDUCATION/TRAINING PROGRAM | Admitting: STUDENT IN AN ORGANIZED HEALTH CARE EDUCATION/TRAINING PROGRAM
Payer: COMMERCIAL

## 2018-02-20 ENCOUNTER — TELEPHONE (OUTPATIENT)
Dept: ONCOLOGY | Facility: CLINIC | Age: 58
End: 2018-02-20

## 2018-02-20 NOTE — TELEPHONE ENCOUNTER
Oncology Distress Screening Follow-up  Clinical Social Work  [facility]    Identified Concern and Score From Distress Screening: If you want to be contacted by one of our professionals, I can send a message to them right now. : Oncology Social Worker    Date of Distress Screenin18    Data: Pt is a 57 yr old male with lung nodule/pulmonary nodule.     Intervention: Spoke with pt as he requested call from .  Pt states no current concerns or issues.  Pt states having questions about upcoming surgery but aware that these will be addressed Thursday at his appt.  Main question being how long he will be out of work after surgery/procedure.     Education Provided: n/a    Follow-up Required: SW available as necessary.       ROCHELLE Courtney, MSW   - [facility]  Phone: [phone]  Pager: [phone]  2018

## 2018-02-21 NOTE — PROGRESS NOTES
THORACIC SURGERY FOLLOW UP VISIT    Dear Dr. Fried,  I saw Mr. Deng Oconnell in follow-up today. 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 Oconnell is a 57 year old male with a RUL nodule that has been followed by Dr. Fried. He has previously been seen in September for this nodule. Because of the location of the nodule he would like require a lobectomy. At that time follow up was planned due to the risk of performing a lobectomy for a benign diagnoses. He was followed with a PET CT which showed the SUV of the nodule was 2.63  He was rediscussed at nodule conference and a decision was made to proceed with surgical resection    From a personal perspective, he is a , lives alone in [place] [place] [facility]. He is single and has no children. His sister lives nearby in [facility] who joined by telephone.    IMPRESSION (R91.1) Lung nodule  (primary encounter diagnosis)  57 year-old male with a RUL solitary nodule    PLAN  I spent a total of 30 minutes with Mr. Deng Oconnell and his sister Marjan (on the phone), more than 50% of which were spent in counseling, coordination of care, and face-to-face time. I reviewed the plan as follows:  Procedure planned: RIGHT  VATS segmentectomy, possible lobectomy and mediastinal node dissection, with possible  thoracotomy.  I reviewed the indications, risks, and benefits of the procedure with Mr. Deng Oconnell. We discussed the intraoperative risks of bleeding and injury to vital organs, potential postoperative complications including, but not limited to, major respiratory events, arrhythmia, bleeding, infection, reoperation, and death. I explained the anticipated hospital course (+/-  3-5 days) and postoperative recovery including pain control, chest drain management, and variable  degrees of dyspnea (or need for supplemental oxygen) and fatigue that tend to get better with time.  We discussed in detail that this could well be a benign lesion and that we could potentially end up with a lobectomy given the difficult location of the nodule. I explained to him what a lobectomy would entail and the increased risk profile with a lobectomy as opposed to a wedge or segmentectomy. He understands this risk and still wishes to proceed  Necessary Tests & Appointments: PAC completed  Pain Control Plan: Liposomal Bupivicaine  Anticoagulation Plan: Lovenox  Smoking Cessation: N/A  All questions were answered and the patient and present family were in agreement with the plan.  I appreciate the opportunity to participate in the care of your patient and will keep you updated.  Sincerely,

## 2018-02-22 ENCOUNTER — OFFICE VISIT (OUTPATIENT)
Dept: SURGERY | Facility: CLINIC | Age: 58
End: 2018-02-22
Payer: COMMERCIAL

## 2018-02-22 ENCOUNTER — APPOINTMENT (OUTPATIENT)
Dept: SURGERY | Facility: CLINIC | Age: 58
End: 2018-02-22
Payer: COMMERCIAL

## 2018-02-22 ENCOUNTER — RADIANT APPOINTMENT (OUTPATIENT)
Dept: CT IMAGING | Facility: CLINIC | Age: 58
End: 2018-02-22
Attending: CLINICAL NURSE SPECIALIST
Payer: COMMERCIAL

## 2018-02-22 ENCOUNTER — ALLIED HEALTH/NURSE VISIT (OUTPATIENT)
Dept: SURGERY | Facility: CLINIC | Age: 58
End: 2018-02-22
Payer: COMMERCIAL

## 2018-02-22 ENCOUNTER — ANESTHESIA EVENT (OUTPATIENT)
Dept: SURGERY | Facility: CLINIC | Age: 58
End: 2018-02-22

## 2018-02-22 ENCOUNTER — OFFICE VISIT (OUTPATIENT)
Dept: SURGERY | Facility: CLINIC | Age: 58
End: 2018-02-22
Attending: STUDENT IN AN ORGANIZED HEALTH CARE EDUCATION/TRAINING PROGRAM
Payer: COMMERCIAL

## 2018-02-22 VITALS
DIASTOLIC BLOOD PRESSURE: 83 MMHG | OXYGEN SATURATION: 97 % | WEIGHT: 228.5 LBS | RESPIRATION RATE: 18 BRPM | BODY MASS INDEX: 34.63 KG/M2 | HEIGHT: 68 IN | SYSTOLIC BLOOD PRESSURE: 144 MMHG | TEMPERATURE: 98 F | HEART RATE: 63 BPM

## 2018-02-22 VITALS
HEART RATE: 63 BPM | DIASTOLIC BLOOD PRESSURE: 83 MMHG | HEIGHT: 68 IN | BODY MASS INDEX: 34.63 KG/M2 | SYSTOLIC BLOOD PRESSURE: 144 MMHG | RESPIRATION RATE: 18 BRPM | TEMPERATURE: 98 F | OXYGEN SATURATION: 97 % | WEIGHT: 228.5 LBS

## 2018-02-22 DIAGNOSIS — Z01.818 PREOP EXAMINATION: Primary | ICD-10-CM

## 2018-02-22 DIAGNOSIS — Z01.818 PREOP EXAMINATION: ICD-10-CM

## 2018-02-22 DIAGNOSIS — R91.1 LUNG NODULE: Primary | ICD-10-CM

## 2018-02-22 DIAGNOSIS — R91.1 LUNG NODULE: ICD-10-CM

## 2018-02-22 LAB
ABO + RH BLD: NORMAL
ABO + RH BLD: NORMAL
ANION GAP SERPL CALCULATED.3IONS-SCNC: 5 MMOL/L (ref 3–14)
BLD GP AB SCN SERPL QL: NORMAL
BLOOD BANK CMNT PATIENT-IMP: NORMAL
BLOOD BANK CMNT PATIENT-IMP: NORMAL
BUN SERPL-MCNC: 13 MG/DL (ref 7–30)
CALCIUM SERPL-MCNC: 8.8 MG/DL (ref 8.5–10.1)
CHLORIDE SERPL-SCNC: 106 MMOL/L (ref 94–109)
CO2 SERPL-SCNC: 29 MMOL/L (ref 20–32)
CREAT SERPL-MCNC: 0.74 MG/DL (ref 0.66–1.25)
ERYTHROCYTE [DISTWIDTH] IN BLOOD BY AUTOMATED COUNT: 13.1 % (ref 10–15)
GFR SERPL CREATININE-BSD FRML MDRD: >90 ML/MIN/1.7M2
GLUCOSE SERPL-MCNC: 98 MG/DL (ref 70–99)
HBA1C MFR BLD: 5.6 % (ref 4.3–6)
HCT VFR BLD AUTO: 43.3 % (ref 40–53)
HGB BLD-MCNC: 14.5 G/DL (ref 13.3–17.7)
INR PPP: 1.04 (ref 0.86–1.14)
MCH RBC QN AUTO: 30.5 PG (ref 26.5–33)
MCHC RBC AUTO-ENTMCNC: 33.5 G/DL (ref 31.5–36.5)
MCV RBC AUTO: 91 FL (ref 78–100)
PLATELET # BLD AUTO: 138 10E9/L (ref 150–450)
POTASSIUM SERPL-SCNC: 4 MMOL/L (ref 3.4–5.3)
RADIOLOGIST FLAGS: NORMAL
RBC # BLD AUTO: 4.76 10E12/L (ref 4.4–5.9)
SODIUM SERPL-SCNC: 140 MMOL/L (ref 133–144)
SPECIMEN EXP DATE BLD: NORMAL
WBC # BLD AUTO: 5.5 10E9/L (ref 4–11)

## 2018-02-22 PROCEDURE — G0463 HOSPITAL OUTPT CLINIC VISIT: HCPCS | Mod: ZF

## 2018-02-22 RX ORDER — ACETAMINOPHEN 325 MG/1
975 TABLET ORAL ONCE
Status: CANCELLED | OUTPATIENT
Start: 2018-02-22 | End: 2018-02-22

## 2018-02-22 RX ORDER — CHLORHEXIDINE GLUCONATE ORAL RINSE 1.2 MG/ML
15 SOLUTION DENTAL ONCE
Status: CANCELLED | OUTPATIENT
Start: 2018-02-22 | End: 2018-02-22

## 2018-02-22 RX ORDER — CELECOXIB 100 MG/1
200 CAPSULE ORAL ONCE
Status: CANCELLED | OUTPATIENT
Start: 2018-02-22 | End: 2018-02-22

## 2018-02-22 RX ORDER — GABAPENTIN 300 MG/1
300 CAPSULE ORAL ONCE
Status: CANCELLED | OUTPATIENT
Start: 2018-02-22 | End: 2018-02-22

## 2018-02-22 ASSESSMENT — LIFESTYLE VARIABLES: TOBACCO_USE: 1

## 2018-02-22 ASSESSMENT — PAIN SCALES - GENERAL: PAINLEVEL: NO PAIN (0)

## 2018-02-22 NOTE — MR AVS SNAPSHOT
After Visit Summary   2018    Deng Oconnell    MRN: [id number]           Patient Information     Date Of Birth          1960        Visit Information        Provider Department      2018 12:00 PM Rn, [facility] Preoperative Assessment [facility]        Care Instructions    Preparing for Your Surgery      Name:  Deng Oconnell   MRN:  [id number]   :  1960   Today's Date:  2018     Arriving for surgery:  Surgery date:  3/5/18  Arrival time: 6:40 am  Please come to:       [facility] [address]  [address] [place]  [address]    -   parking is available in front of the hospital from 5:15 am to 8:00 pm    -  Stop at the Information Desk in the lobby    -   Inform the information person that you are here for surgery. An escort to 3c will be provided. If you would not like an escort, please proceed to 3C on the 3rd floor. [phone]     What can I eat or drink?  -  You may have solid food or milk products until 8 hours prior to your surgery. Stop midnight 3/5/18  -  You may have water, apple juice or 7up/Sprite until 2 hours prior to your surgery. Stop 6:30am  3/5/18    Which medicines can I take?         Stop aspirin 7 days before surgery.       Stop advil/obuprofn 1 - 2 days before surgery.  -  Do NOT take these medications in the morning, the day of surgery: METFORMIN          TAKE this medication morning of surgery:  METOPROLOL    Questions or Concerns:  If you have questions or concerns, please call the  Preoperative Assessment Center, Monday-Friday 7AM-7PM:  [phone]  AFTER YOUR SURGERY  Breathing exercises   Breathing exercises help you recover faster. Take deep breaths and let the air out slowly. This will:     Help you wake up after surgery.    Help prevent complications like pneumonia.  Preventing complications will help you go home sooner.   We may give you a breathing device (incentive spirometer) to encourage you to breathe  deeply.   Nausea and vomiting   You may feel sick to your stomach after surgery; if so, let your nurse know.    Pain control:  After surgery, you may have pain. Our goal is to help you manage your pain. Pain medicine will help you feel comfortable enough to do activities that will help you heal.  These activities may include breathing exercises, walking and physical therapy.   To help your health care team treat your pain we will ask: 1) If you have pain  2) where it is located 3) describe your pain in your words  Methods of pain control include medications given by mouth, vein or by nerve block for some surgeries.  We may give you a pain control pump that will:  1) Deliver the medicine through a tube placed in your vein  2) Control the amount of medicine you receive  3) Allow you to push a button to deliver a dose of pain medicine  Sequential Compression Device (SCD) or Pneumo Boots:  You may need to wear SCD S on your legs or feet. These are wraps connected to a machine that pumps in air and releases it. The repeated pumping helps prevent blood clots from forming.     How do I prepare myself?  -  Take two showers: one the night before surgery; and one the morning of surgery.         Use Scrubcare or Hibiclens to wash from neck down.  You may use your own shampoo and conditioner. No other hair products.   -  Do NOT use lotion, powder, deodorant, or antiperspirant the day of your surgery.  -  Do NOT wear any makeup, fingernail polish or jewelry.  -  Begin using Incentive Spirometer 1 week prior to surgery.  Use 4 times per day, up to 5 breaths each time.  Bring Incentive Spirometer to hospital.  -Do not bring your own medications to the hospital, except for inhalers and eye drops.  -  Bring your ID and insurance card.    Questions or Concerns:  If you have questions or concerns, please call the  Preoperative Assessment Center, Monday-Friday 7AM-7PM:  [phone]  Enhanced Recovery After Surgery     This is a team  effort, including you, to get you back on your feet, eating and drinking normally and out of the hospital as quickly as possible.  The goals are: 1) NO INFECTIONS and   2) RETURN TO NORMAL DIET    How can we achieve these goals?  1) STAY ACTIVE: Walk every day before your surgery; try to increase the amount every day.  Walk after surgery as much as you can-the nurses will help you.  Walking speeds healing and gets you home quicker, you heal better at home and have less risk of infection.     2) INCENTIVE SPIROMETER: Practice your incentive spirometer 4 times per day with 5-10 repetitions each time.  Using the incentive spirometer can strengthen your muscles between your ribs and help you have a strong cough after surgery.  A more effective cough can help prevent problems with your lungs.    3) STAY HYDRATED: Drink clear liquids up until 2 hours before your surgery. We would like you to purchase a drink such as Gatorade or Ensure Clear (not the milkshake type).  Drink this before bedtime and on the way into the hospital, drink between 8-12 ounces or until you feel hydrated.  Keeping well hydrated leads to your veins being plump, you wake up faster, and you are less likely to be nauseated. Start drinking water as soon as you can after surgery and advance to clear liquids and food as tolerated.  IV fluids contain salt, drinking fluids will minimize the amount of IV fluids you need and decrease the amount of salt you get.    The most common reason for the patient to be readmitted is dehydration. Staying hydrated after you go home from the hospital is very important.  Ensure or Ensure Clear are good options to keep you hydrated.     4) PAIN MANAGEMENT: If we minimize the amount of opioids and narcotics, and use regional blocks (which numb the area where your surgery is) along with oral pain medications; you will have less side effects of nausea and constipation. Narcotics can slow down your bowels and cause you to stay  in the hospital longer.     Our goal is to keep you comfortable; eating and drinking normally and back home safely.   Using an Incentive Spirometer    An incentive spirometer is a device that helps you do deep breathing exercises. These exercises expand your lungs, aid in circulation, and help prevent pneumonia. Deep breathing exercises also help you breathe better and improve the function of your lungs by:    Keeping your lungs clear    Strengthening your breathing muscles    Helping prevent respiratory complications or problems  The incentive spirometer gives you a way to take an active part in your care. A nurse or therapist will teach you breathing exercises. To do these exercises, you will breathe in through your mouth and not your nose. The incentive spirometer only works correctly if you breathe in through your mouth.    Steps to clear lungs  Step 1. Exhale normally. Then, inhale normally.    Relax and breathe out.  Step 2. Place your lips tightly around the mouthpiece.    Make sure the device is upright and not tilted.  Step 3. Inhale as much air as you can through the mouthpiece (don't breath through your nose).    Inhale slowly and deeply.    Hold your breath long enough to keep the balls or disk raised for at least 3 to 5 seconds, or as instructed by your healthcare provider.  Step 4. Repeat the exercise regularly.    Begin using the Incentive Spirometer one week prior to your surgery, 4 times per day-5 times each.                      Follow-ups after your visit        Your next 10 appointments already scheduled     Feb 22, 2018 12:00 PM CST   (Arrive by 11:45 AM)   PAC RN ASSESSMENT with Jameel Pac Rn   [facility] Preoperative [facility] ([facility])    [address]  4th [facility] [id number] [phone]            Feb 22, 2018 12:30 PM CST   (Arrive by 12:15 PM)   PAC Anesthesia Consult with Jameel Pac Anesthesiologist   [facility] Preoperative Assessment  Center ([facility])    [address]  4th Floor  [facility] [address]   [phone]            Feb 22, 2018 12:45 PM CST   (Arrive by 12:30 PM)   Return Visit with Estefany Main MD   [facility] ([facility])    [address]  [address]  [place] [address]   [phone]            Feb 22, 2018  1:15 PM CST   LAB with  LAB   [facility])    [address] [place] [address]  1st Floor  [facility] [address]   [phone]           Please do not eat 10-12 hours before your appointment if you are coming in fasting for labs on lipids, cholesterol, or glucose (sugar). This does not apply to pregnant women. Water, hot tea and black coffee (with nothing added) are okay. Do not drink other fluids, diet soda or chew gum.            Mar 05, 2018   Procedure with Estefany Main MD   [facility] [place] Same Day Surgery (--)    [address] [facility] [address]   [phone]            Apr 05, 2018 10:30 AM CDT   (Arrive by 10:15 AM)   Return Visit with Estefany Main MD   [facility] ([facility])    [address] [place] [address]  [address]  [facility] [address] [phone]              Future tests that were ordered for you today     Open Future Orders        Priority Expected Expires Ordered    INR Routine 2/22/2018 3/24/2018 2/22/2018    Hemoglobin A1c Routine 2/22/2018 3/24/2018 2/22/2018    ABO/Rh type and screen Routine 2/22/2018 3/24/2018 2/22/2018    Basic metabolic panel Routine 2/22/2018 3/24/2018 2/22/2018    CBC with platelets Routine 2/22/2018 3/24/2018 2/22/2018            Who to contact     Please call your clinic at [phone] to:    Ask questions about your health    Make or cancel appointments    Discuss your medicines    Learn about your test results    Speak to your doctor            Additional Information About Your Visit        [employer]  Information     [employer] is an electronic gateway that provides easy, online access to your medical records. With [employer], you can request a clinic appointment, read your test results, renew a prescription or communicate with your care team.     To sign up for [employer] visit the website at [email or web address]   You will be asked to enter the access code listed below, as well as some personal information. Please follow the directions to create your username and password.     Your access code is: [email or web address]  Expires: 2018 11:12 AM     Your access code will  in 90 days. If you need help or a new code, please contact your [facility] or call [phone] for assistance.        Care EveryWhere ID     This is your Care EveryWhere ID. This could be used by other organizations to access your [place] medical records  [id number]         Blood Pressure from Last 3 Encounters:   18 144/83   18 128/75   17 142/65    Weight from Last 3 Encounters:   18 103.6 kg (228 lb 8 oz)   18 105.7 kg (233 lb 0.4 oz)   17 110.9 kg (244 lb 6.4 oz)              Today, you had the following     No orders found for display       Primary Care Provider Fax #    Physician No Ref-Primary [phone]       No address on file        Equal Access to Services     AMANDA SHIN : Hadii parmjit Gamlbe, waaxda luqadaha, qaybta kaalmasangeeta floyd . So [facility] [phone].    ATENCIÓN: Si habla español, tiene a reardon disposición servicios gratuitos de asistencia lingüística. Felix al [phone].    We comply with applicable federal civil rights laws and [place] laws. We do not discriminate on the basis of race, color, national origin, age, disability, sex, sexual orientation, or gender identity.            Thank you!     Thank you for choosing [facility] PREOPERATIVE ASSESSMENT CENTER  for your care. Our goal is always to  provide you with excellent care. Hearing back from our patients is one way we can continue to improve our services. Please take a few minutes to complete the written survey that you may receive in the mail after your visit with us. Thank you!             Your Updated Medication List - Protect others around you: Learn how to safely use, store and throw away your medicines at www.disposemymeds.org.          This list is accurate as of 2/22/18 11:42 AM.  Always use your most recent med list.                   Brand Name Dispense Instructions for use Diagnosis    ASPIRIN PO      Take 81 mg by mouth every morning        metFORMIN 500 MG tablet    GLUCOPHAGE     Take 500 mg by mouth 2 times daily (with meals)        metoprolol succinate 25 MG 24 hr tablet    TOPROL-XL     Take 25 mg by mouth every morning

## 2018-02-22 NOTE — NURSING NOTE
"Oncology Rooming Note    February 22, 2018 12:35 PM   Deng Oconnell is a 57 year old male who presents for:    Chief Complaint   Patient presents with     Oncology Clinic Visit     Pre-Op Visit for 03/05/18 Surgery.     Initial Vitals: /83  Pulse 63  Temp 98  F (36.7  C) (Oral)  Resp 18  Ht 1.727 m (5' 7.99\")  Wt 103.6 kg (228 lb 8 oz)  SpO2 97%  BMI 34.75 kg/m2 Estimated body mass index is 34.75 kg/(m^2) as calculated from the following:    Height as of this encounter: 1.727 m (5' 7.99\").    Weight as of this encounter: 103.6 kg (228 lb 8 oz). Body surface area is 2.23 meters squared.  No Pain (0) Comment: Data Unavailable   No LMP for male patient.  Allergies reviewed: Yes  Medications reviewed: Yes    Medications: MEDICATION REFILLS NEEDED TODAY. Provider was NOT notified.  Pharmacy name entered into [facility]: [facility]    Clinical concerns: None. Dr Main was NOT notified.    7 minutes for nursing intake (face to face time)     Kathy Olivas LPN              "

## 2018-02-22 NOTE — H&P
Pre-Operative H & P     CC:  Preoperative exam to assess for increased cardiopulmonary risk while undergoing surgery and anesthesia.    Date of Encounter: 2/22/2018  Primary Care Physician:  No Ref-Primary, Physician  Associated diagnosis: lung nodule    HPI  Deng Oconnell is a 57 year old male who presents for pre-operative H & P in preparation for a Flexible Bronchoscopy, Right Thorascopic Right Upper Lobe Wedge Resection, Possible Segmentectomy, Possible Right Lobectomy with Cruzito Main and [place] on 5/23/18 at [facility].     Deng Oconnell is a 57 year old male with hypertension, cardiomyopathy, frequent PVCs, obesity, history of smoking, diabetes and alcohol dependence in remission that has a recent finding of a concerning right lung nodule.  This nodule was found with a screening chest CT that was ordered due to his smoking history.  He was subsequently referred here for surgical consultation and the above listed procedure has been recommended.       History is obtained from the patient.     Past Medical History  Past Medical History:   Diagnosis Date     Alcohol dependence in remission (H)      Diabetes (H)      Frequent PVCs      History of smoking      Hypertension      Obesity        Past Surgical History  Past Surgical History:   Procedure Laterality Date     ARTHROSCOPY KNEE  1979     HERNIA REPAIR       patellar tendon repair         Hx of Blood transfusions/reactions: none     Hx of abnormal bleeding or anti-platelet use: aspirin      Steroid use in the last year: none    Personal or FH with difficulty with Anesthesia:  none    Prior to Admission Medications  Current Outpatient Prescriptions   Medication Sig Dispense Refill     metFORMIN (GLUCOPHAGE) 500 MG tablet Take 500 mg by mouth 2 times daily (with meals)       ASPIRIN PO Take 81 mg by mouth every morning        metoprolol (TOPROL-XL) 25 MG 24 hr tablet Take 25 mg by mouth every morning          Allergies  No  Known Allergies    Social History  Social History     Social History     Marital status: Single     Spouse name: N/A     Number of children: 0     Years of education: N/A     Occupational History           Social History Main Topics     Smoking status: Former Smoker     Packs/day: 3.00     Years: 33.00     Types: Cigarettes     Quit date: 8/25/2005     Smokeless tobacco: Never Used     Alcohol use No     Drug use: No     Sexual activity: Not on file     Other Topics Concern     Not on file     Social History Narrative       Family History  Family History   Problem Relation Age of Onset     Heart Failure Mother      Lung Cancer Father      Emphysema Father      No Known Problems Sister      No Known Problems Brother      Unknown/Adopted Maternal Grandmother      Unknown/Adopted Maternal Grandfather      Unknown/Adopted Paternal Grandmother      No Known Problems Sister              ROS/MED HX  The complete review of systems is negative other than noted in the HPI or here.   ENT/Pulmonary:     (+)HARVEY risk factors snores loudly, hypertension, tobacco use, Past use 3 packs/day  , . Other pulmonary disease right lung nodule.    Neurologic:  - neg neurologic ROS     Cardiovascular: Comment: cardiomyopathy    (+) hypertension----. Taking blood thinners : Instructions Given to patient: stop 7 days. . . :. Irregular Heartbeat/Palpitations (frequent PVCs, no palpitations), .   METS/Exercise Tolerance:  >4 METS   Hematologic:  - neg hematologic  ROS      (-) history of blood clots   Musculoskeletal:  - neg musculoskeletal ROS       GI/Hepatic:  - neg GI/hepatic ROS       Renal/Genitourinary:  - ROS Renal section negative       Endo:     (+) type II DM, A1c 5.6 on date: 2/22/2018 Not using insulin - not using insulin pump Normal glucose range: 7 day average = 112, 30 day average = 115 not previously admitted for DM/DKA Obesity, .      Psychiatric:     (+) psychiatric history other (comment) (alcohol dependence in  "remission)      Infectious Disease:  - neg infectious disease ROS       Malignancy:      - no malignancy   Other:    (+) no H/O Chronic Pain,  - neg other ROS             Temp: 98  F (36.7  C) Temp src: Oral BP: 144/83 Pulse: 63   Resp: 18 SpO2: 97 %         228 lbs 8 oz  5' 8\"   Body mass index is 34.74 kg/(m^2).       Physical Exam  Constitutional: Awake, alert, cooperative, no apparent distress, and appears stated age.  Eyes: Pupils equal, round and reactive to light, extra ocular muscles intact, sclera clear, conjunctiva normal.  HENT: Normocephalic, oral pharynx with moist mucus membranes.  Upper and lower partials.  No goiter appreciated.   Respiratory: Clear to auscultation bilaterally, no crackles or wheezing.  Cardiovascular: Regular rate and rhythm, normal S1 and S2, and no murmur noted.  Carotids +2, no bruits. No edema. Palpable pulses to radial  DP and PT arteries.   GI: Normal bowel sounds, soft, non-distended, non-tender, no masses palpated, no hepatosplenomegaly.    Lymph/Hematologic: No cervical lymphadenopathy and no supraclavicular lymphadenopathy.  Genitourinary:  deferred  Skin: Warm and dry.  No rashes at anticipated surgical site.   Musculoskeletal: Full ROM of neck. There is no redness, warmth, or swelling of the exposed joints. Gross motor strength is normal.    Neurologic: Awake, alert, oriented to name, place and time. Cranial nerves II-XII are grossly intact. Gait is normal.   Neuropsychiatric: Calm, cooperative. Normal affect.     Labs: (personally reviewed)   Component      Latest Ref Rng & Units 2/22/2018   Sodium      133 - 144 mmol/L 140   Potassium      3.4 - 5.3 mmol/L 4.0   Chloride      94 - 109 mmol/L 106   Carbon Dioxide      20 - 32 mmol/L 29   Anion Gap      3 - 14 mmol/L 5   Glucose      70 - 99 mg/dL 98   Urea Nitrogen      7 - 30 mg/dL 13   Creatinine      0.66 - 1.25 mg/dL 0.74   GFR Estimate      >60 mL/min/1.7m2 >90   GFR Estimate If Black      >60 mL/min/1.7m2 >90 "   Calcium      8.5 - 10.1 mg/dL 8.8   WBC      4.0 - 11.0 10e9/L 5.5   RBC Count      4.4 - 5.9 10e12/L 4.76   Hemoglobin      13.3 - 17.7 g/dL 14.5   Hematocrit      40.0 - 53.0 % 43.3   MCV      78 - 100 fl 91   MCH      26.5 - 33.0 pg 30.5   MCHC      31.5 - 36.5 g/dL 33.5   RDW      10.0 - 15.0 % 13.1   Platelet Count      150 - 450 10e9/L 138 (L)   INR      0.86 - 1.14 1.04   Hemoglobin A1C      4.3 - 6.0 % 5.6           EK2017  Sinus bradycardia      Cardiac Cath  10/13/2017  Conclusions    1. Nonobstructive CAD.    Recommendations    * Continue medical therapy for diabetes.    * Smoking cessation counseling is recommended for this patient.      Diagnostic Summary    * LM has 0% stenosis.    * LAD has 0% stenosis.    * CX has 0% stenosis.    * pRCA to dRCA: Minimal luminal irregularities, GORDON: 3 flow.    * Coronary angiography shows right dominance.    Stress Echo  2017  Interpretation Summary    * No obvious wall motion abnormalities with exercise stress echocardiogram;  difficult post stress images given frequent ectopy during stress test.    * Normal hemodynamic response to exercise stress.  No chest pain during the  test.  There was frequent ventricular bigeminy on stress ECG and into  recovery.  Patient had a 4 beat run of nonsustained VT in recovery and  ventricular couplets.  Patient exercised for 6 minutes on a John protocol  achieving 5.1 METS.    * The left ventricle is mildly dilated with mildly reduced systolic  function.  LVEF 45-50%.    Recommendations    * Recommend coronary angiogram for further ischemic evaluation.      Outside records reviewed from: Care Everywhere        ASSESSMENT and PLAN  Deng Oconnell is a 57 year old male scheduled for a Flexible Bronchoscopy, Right Thorascopic Right Upper Lobe Wedge Resection, Possible Segmentectomy, Possible Right Lobectomy on 3/5/2018 by Cruzito Main and [place] in treatment of a lung nodule.  PAC referral for risk assessment and optimization  for anesthesia with comorbid conditions of: hypertension, cardiomyopathy, frequent PVCs, obesity, history of smoking, diabetes and alcohol dependence in remission.      Pre-operative considerations:  1.  Cardiac:  Functional status- METS >4.  He has lost 60lbs since this past summer by starting to exercise more regularly.  He walks 30 minutes at a time and uses his stationary bicycle 45-60 minutes.  He is on Toprol Xl due to having frequent PVCs.  He was evaluated thoroughly by cardiology at [facility] this past fall and it was determined that he had cardiomyopathy secondary to high PVC burden.  He was started on the Toprol XL and follow up holter monitoring showed a decrease in the PVC burden.  They have recommended follow up echo in May 2018 for the cardiomyopathy.   The EF on the stress echo in Sept 2017 was 45-50%.  Cardiac cath in October 2017 showed non-obstructive CAD.  High risk surgery with 0.9% risk of major adverse cardiac event.   2.  Pulm:  Airway feasible.  HARVEY risk: intermediate.  He quit smoking in 2005.  He is obese with a BMI >30.  3.  GI:  Risk of PONV score = 2.  If > 2, anti-emetic intervention recommended.    4. 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  At that time his A1c was >8.  He started exercising more, changed his diet and started metformin.  He reports that his last A1c had decreased to 5.8. Instructed to hold metformin the DOS.  5. Heme:  Instructed to hold his aspirin 7 days prior to surgery.  His platelet count is slightly low at 138.     6. Psych:  Alcohol dependence in remission since 1997.      VTE risk:  0.5%    Patient is optimized and is acceptable candidate for the proposed procedure.  No further diagnostic evaluation is needed.     Patient also evaluated by Dr. Reid.           Michelle Barajas, DNP, RN, APRN  Preoperative Assessment Center  [facility]  Clinic and Surgery Center  Phone:  [phone]  Fax: [phone]

## 2018-02-22 NOTE — LETTER
2/22/2018       RE: Deng Oconnell  [address] [place] [address]     Dear Colleague,    Thank you for referring your patient, Deng Oconnell, to the [facility]. Please see a copy of my visit note below.    THORACIC SURGERY FOLLOW UP VISIT    Dear Dr. Fried,  I saw Mr. Deng Oconnell in follow-up today. 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 Oconnell is a 57 year old male with a RUL nodule that has been followed by Dr. Fried. He has previously been seen in September for this nodule. Because of the location of the nodule he would like require a lobectomy. At that time follow up was planned due to the risk of performing a lobectomy for a benign diagnoses. He was followed with a PET CT which showed the SUV of the nodule was 2.63  He was rediscussed at nodule conference and a decision was made to proceed with surgical resection    From a personal perspective, he is a , lives alone in [facility]. He is single and has no children. His sister lives nearby in [facility]  who joined by telephone.    IMPRESSION (R91.1) Lung nodule  (primary encounter diagnosis)  57 year-old male with a RUL solitary nodule    PLAN  I spent a total of 30 minutes with Mr. Deng Oconnell and his sister Marjan (on the phone), more than 50% of which were spent in counseling, coordination of care, and face-to-face time. I reviewed the plan as follows:  Procedure planned: RIGHT  VATS segmentectomy, possible lobectomy and mediastinal node dissection, with possible  thoracotomy.  I reviewed the indications, risks, and benefits of the procedure with Mr. Deng Oconnell. We discussed the intraoperative risks of bleeding and injury to vital organs, potential postoperative complications including, but not limited to,  major respiratory events, arrhythmia, bleeding, infection, reoperation, and death. I explained the anticipated hospital course (+/-  3-5 days) and postoperative recovery including pain control, chest drain management, and variable degrees of dyspnea (or need for supplemental oxygen) and fatigue that tend to get better with time.  We discussed in detail that this could well be a benign lesion and that we could potentially end up with a lobectomy given the difficult location of the nodule. I explained to him what a lobectomy would entail and the increased risk profile with a lobectomy as opposed to a wedge or segmentectomy. He understands this risk and still wishes to proceed  Necessary Tests & Appointments: PAC completed  Pain Control Plan: Liposomal Bupivicaine  Anticoagulation Plan: Lovenox  Smoking Cessation: N/A  All questions were answered and the patient and present family were in agreement with the plan.  I appreciate the opportunity to participate in the care of your patient and will keep you updated.  Sincerely,        Estefany Main MD

## 2018-02-22 NOTE — PATIENT INSTRUCTIONS
Preparing for Your Surgery      Name:  Deng Oconnell   MRN:  [id number]   :  1960   Today's Date:  2018     Arriving for surgery:  Surgery date:  3/5/18  Arrival time: 6:40 am  Please come to:       [facility] [address]  [address] [place]  [address]    -   parking is available in front of the hospital from 5:15 am to 8:00 pm    -  Stop at the Information Desk in the lobby    -   Inform the information person that you are here for surgery. An escort to 3c will be provided. If you would not like an escort, please proceed to 3C on the 3rd floor. [phone]     What can I eat or drink?  -  You may have solid food or milk products until 8 hours prior to your surgery. Stop midnight 3/5/18  -  You may have water, apple juice or 7up/Sprite until 2 hours prior to your surgery. Stop 6:30am  3/5/18    Which medicines can I take?         Stop aspirin 7 days before surgery.       Stop advil/obuprofn 1 - 2 days before surgery.  -  Do NOT take these medications in the morning, the day of surgery: METFORMIN          TAKE this medication morning of surgery:  METOPROLOL    Questions or Concerns:  If you have questions or concerns, please call the  Preoperative Assessment Center, Monday-Friday 7AM-7PM:  [phone]  AFTER YOUR SURGERY  Breathing exercises   Breathing exercises help you recover faster. Take deep breaths and let the air out slowly. This will:     Help you wake up after surgery.    Help prevent complications like pneumonia.  Preventing complications will help you go home sooner.   We may give you a breathing device (incentive spirometer) to encourage you to breathe deeply.   Nausea and vomiting   You may feel sick to your stomach after surgery; if so, let your nurse know.    Pain control:  After surgery, you may have pain. Our goal is to help you manage your pain. Pain medicine will help you feel comfortable enough to do activities that will help you heal.   These activities may include breathing exercises, walking and physical therapy.   To help your health care team treat your pain we will ask: 1) If you have pain  2) where it is located 3) describe your pain in your words  Methods of pain control include medications given by mouth, vein or by nerve block for some surgeries.  We may give you a pain control pump that will:  1) Deliver the medicine through a tube placed in your vein  2) Control the amount of medicine you receive  3) Allow you to push a button to deliver a dose of pain medicine  Sequential Compression Device (SCD) or Pneumo Boots:  You may need to wear SCD S on your legs or feet. These are wraps connected to a machine that pumps in air and releases it. The repeated pumping helps prevent blood clots from forming.     How do I prepare myself?  -  Take two showers: one the night before surgery; and one the morning of surgery.         Use Scrubcare or Hibiclens to wash from neck down.  You may use your own shampoo and conditioner. No other hair products.   -  Do NOT use lotion, powder, deodorant, or antiperspirant the day of your surgery.  -  Do NOT wear any makeup, fingernail polish or jewelry.  -  Begin using Incentive Spirometer 1 week prior to surgery.  Use 4 times per day, up to 5 breaths each time.  Bring Incentive Spirometer to hospital.  -Do not bring your own medications to the hospital, except for inhalers and eye drops.  -  Bring your ID and insurance card.    Questions or Concerns:  If you have questions or concerns, please call the  Preoperative Assessment Center, Monday-Friday 7AM-7PM:  [phone]  Enhanced Recovery After Surgery     This is a team effort, including you, to get you back on your feet, eating and drinking normally and out of the hospital as quickly as possible.  The goals are: 1) NO INFECTIONS and   2) RETURN TO NORMAL DIET    How can we achieve these goals?  1) STAY ACTIVE: Walk every day before your surgery; try to increase  the amount every day.  Walk after surgery as much as you can-the nurses will help you.  Walking speeds healing and gets you home quicker, you heal better at home and have less risk of infection.     2) INCENTIVE SPIROMETER: Practice your incentive spirometer 4 times per day with 5-10 repetitions each time.  Using the incentive spirometer can strengthen your muscles between your ribs and help you have a strong cough after surgery.  A more effective cough can help prevent problems with your lungs.    3) STAY HYDRATED: Drink clear liquids up until 2 hours before your surgery. We would like you to purchase a drink such as Gatorade or Ensure Clear (not the milkshake type).  Drink this before bedtime and on the way into the hospital, drink between 8-12 ounces or until you feel hydrated.  Keeping well hydrated leads to your veins being plump, you wake up faster, and you are less likely to be nauseated. Start drinking water as soon as you can after surgery and advance to clear liquids and food as tolerated.  IV fluids contain salt, drinking fluids will minimize the amount of IV fluids you need and decrease the amount of salt you get.    The most common reason for the patient to be readmitted is dehydration. Staying hydrated after you go home from the hospital is very important.  Ensure or Ensure Clear are good options to keep you hydrated.     4) PAIN MANAGEMENT: If we minimize the amount of opioids and narcotics, and use regional blocks (which numb the area where your surgery is) along with oral pain medications; you will have less side effects of nausea and constipation. Narcotics can slow down your bowels and cause you to stay in the hospital longer.     Our goal is to keep you comfortable; eating and drinking normally and back home safely.   Using an Incentive Spirometer    An incentive spirometer is a device that helps you do deep breathing exercises. These exercises expand your lungs, aid in circulation, and help  prevent pneumonia. Deep breathing exercises also help you breathe better and improve the function of your lungs by:    Keeping your lungs clear    Strengthening your breathing muscles    Helping prevent respiratory complications or problems  The incentive spirometer gives you a way to take an active part in your care. A nurse or therapist will teach you breathing exercises. To do these exercises, you will breathe in through your mouth and not your nose. The incentive spirometer only works correctly if you breathe in through your mouth.    Steps to clear lungs  Step 1. Exhale normally. Then, inhale normally.    Relax and breathe out.  Step 2. Place your lips tightly around the mouthpiece.    Make sure the device is upright and not tilted.  Step 3. Inhale as much air as you can through the mouthpiece (don't breath through your nose).    Inhale slowly and deeply.    Hold your breath long enough to keep the balls or disk raised for at least 3 to 5 seconds, or as instructed by your healthcare provider.  Step 4. Repeat the exercise regularly.    Begin using the Incentive Spirometer one week prior to your surgery, 4 times per day-5 times each.

## 2018-02-22 NOTE — LETTER
March 29, 2018      Deng Kourtney  [address] [place] [address] [address] [place] [address]        Dear ,    We are writing to inform you of your test results.  We attempted to contact you with results multiple times, but no answer.  Message left with PAC # to call for results, but we also put a copy in the mail as you had requested.  All labs are good for surgery    Resulted Orders   ABO/Rh type and screen   Result Value Ref Range    ABO O     RH(D) Neg     Antibody Screen Neg     Test Valid Only At          [facility]    Specimen Expires 02/25/2018     Blood Bank Comment       Preadmt form and TYSC rec'd 02/22/18 for surgery on 03/05/18. hh   Basic metabolic panel   Result Value Ref Range    Sodium 140 133 - 144 mmol/L    Potassium 4.0 3.4 - 5.3 mmol/L    Chloride 106 94 - 109 mmol/L    Carbon Dioxide 29 20 - 32 mmol/L    Anion Gap 5 3 - 14 mmol/L    Glucose 98 70 - 99 mg/dL    Urea Nitrogen 13 7 - 30 mg/dL    Creatinine 0.74 0.66 - 1.25 mg/dL    GFR Estimate >90 >60 mL/min/1.7m2      Comment:      Non  GFR Calc    GFR Estimate If Black >90 >60 mL/min/1.7m2      Comment:       GFR Calc    Calcium 8.8 8.5 - 10.1 mg/dL   CBC with platelets   Result Value Ref Range    WBC 5.5 4.0 - 11.0 10e9/L    RBC Count 4.76 4.4 - 5.9 10e12/L    Hemoglobin 14.5 13.3 - 17.7 g/dL    Hematocrit 43.3 40.0 - 53.0 %    MCV 91 78 - 100 fl    MCH 30.5 26.5 - 33.0 pg    MCHC 33.5 31.5 - 36.5 g/dL    RDW 13.1 10.0 - 15.0 %    Platelet Count 138 (L) 150 - 450 10e9/L   INR   Result Value Ref Range    INR 1.04 0.86 - 1.14   Hemoglobin A1c   Result Value Ref Range    Hemoglobin A1C 5.6 4.3 - 6.0 %       If you have any questions or concerns, please call the clinic at the number listed above.       Sincerely,        Michelle Barajas DNP, RN, ANP-C

## 2018-02-22 NOTE — MR AVS SNAPSHOT
After Visit Summary   2/22/2018    Deng Oconnell    MRN: [id number]           Patient Information     Date Of Birth          1960        Visit Information        Provider Department      2/22/2018 12:45 PM Estefany Main MD [facility]        Today's Diagnoses     Lung nodule    -  1       Follow-ups after your visit        Your next 10 appointments already scheduled     Aug 28, 2018 12:40 PM CDT   (Arrive by 12:25 PM)   CT CHEST W/O CONTRAST with UCCT2   [facility] ([facility])    [address]  1st Floor  [facility] [address] [phone]           Please bring any scans or X-rays taken at other hospitals, if similar tests were done. Also bring a list of your medicines, including vitamins, minerals and over-the-counter drugs. It is safest to leave personal items at home.  Be sure to tell your doctor:   If you have any allergies.   If there s any chance you are pregnant.   If you are breastfeeding.  You do not need to do anything special to prepare for this exam.  Please wear loose clothing, such as a sweat suit or jogging clothes. Avoid snaps, zippers and other metal. We may ask you to undress and put on a hospital gown.            Aug 28, 2018  1:30 PM CDT   (Arrive by 1:15 PM)   Return Visit with Jono Fried MD   [facility] ([facility])    [address] [place] [address]  [address] [place] [address] [phone]              Who to contact     If you have questions or need follow up information about today's clinic visit or your schedule please contact [facility] directly at [phone].  Normal or non-critical lab and imaging results will be communicated to you by MyChart, letter or phone within 4 business days after the clinic has received the results. If you do not hear from us within 7 days, please contact the clinic through MyChart or phone. If you  "have a critical or abnormal lab result, we will notify you by phone as soon as possible.  Submit refill requests through [employer] or call your pharmacy and they will forward the refill request to us. Please allow 3 business days for your refill to be completed.          Additional Information About Your Visit        [employer] Information     [employer] lets you send messages to your doctor, view your test results, renew your prescriptions, schedule appointments and more. To sign up, go to [email or web address] . Click on \"Log in\" on the left side of the screen, which will take you to the Welcome page. Then click on \"Sign up Now\" on the right side of the page.     You will be asked to enter the access code listed below, as well as some personal information. Please follow the directions to create your username and password.     Your access code is: [email or web address]  Expires: 2018 10:18 AM     Your access code will  in 90 days. If you need help or a new code, please call your [place] clinic or [phone].        Care EveryWhere ID     This is your Care EveryWhere ID. This could be used by other organizations to access your [place] medical records  [id number]        Your Vitals Were     Pulse Temperature Respirations Height Pulse Oximetry BMI (Body Mass Index)    63 98  F (36.7  C) (Oral) 18 1.727 m (5' 7.99\") 97% 34.75 kg/m2       Blood Pressure from Last 3 Encounters:   18 144/83   18 144/83   18 128/75    Weight from Last 3 Encounters:   18 103.6 kg (228 lb 8 oz)   18 103.6 kg (228 lb 8 oz)   18 105.7 kg (233 lb 0.4 oz)              Today, you had the following     No orders found for display       Primary Care Provider Fax #    Physician No Ref-Primary [phone]       No address on file        Equal Access to Services     AMANDA SHIN : Kelly Gamble, elisa cueto, sangeeta jiang. So [facility] [place] " [phone].    ATENCIÓN: Si gil sims, tiene a reardon disposición servicios gratuitos de asistencia lingüística. Felix florez [phone].    We comply with applicable federal civil rights laws and [place] laws. We do not discriminate on the basis of race, color, national origin, age, disability, sex, sexual orientation, or gender identity.            Thank you!     Thank you for choosing [facility]  for your care. Our goal is always to provide you with excellent care. Hearing back from our patients is one way we can continue to improve our services. Please take a few minutes to complete the written survey that you may receive in the mail after your visit with us. Thank you!             Your Updated Medication List - Protect others around you: Learn how to safely use, store and throw away your medicines at www.disposemymeds.org.          This list is accurate as of 2/22/18 11:59 PM.  Always use your most recent med list.                   Brand Name Dispense Instructions for use Diagnosis    ASPIRIN PO      Take 81 mg by mouth every morning        metFORMIN 500 MG tablet    GLUCOPHAGE     Take 500 mg by mouth 2 times daily (with meals)        metoprolol succinate 25 MG 24 hr tablet    TOPROL-XL     Take 25 mg by mouth every morning

## 2018-02-22 NOTE — ANESTHESIA PREPROCEDURE EVALUATION
Anesthesia Evaluation     . Pt has had prior anesthetic. Type: General and MAC    No history of anesthetic complications          ROS/MED HX    ENT/Pulmonary:     (+)HARVEY risk factors snores loudly, hypertension, tobacco use, Past use 3 packs/day  , . Other pulmonary disease right lung nodule.    Neurologic:  - neg neurologic ROS     Cardiovascular: Comment: cardiomyopathy    (+) hypertension----. Taking blood thinners : Instructions Given to patient: stop 7 days. . . :. Irregular Heartbeat/Palpitations (frequent PVCs, no palpitations), . Previous cardiac testing date:results:Stress Testdate:9/26/2017 results:Interpretation Summary    * No obvious wall motion abnormalities with exercise stress echocardiogram;  difficult post stress images given frequent ectopy during stress test.    * Normal hemodynamic response to exercise stress.  No chest pain during the  test.  There was frequent ventricular bigeminy on stress ECG and into  recovery.  Patient had a 4 beat run of nonsustained VT in recovery and  ventricular couplets.  Patient exercised for 6 minutes on a John protocol  achieving 5.1 METS.    * The left ventricle is mildly dilated with mildly reduced systolic  function.  LVEF 45-50%.    Recommendations    * Recommend coronary angiogram for further ischemic evaluation.ECG reviewed date:11/13/2017 results:Sinus bradycardiaCath date: 10/13/2017 results:Conclusions    1. Nonobstructive CAD.    Recommendations    * Continue medical therapy for diabetes.    * Smoking cessation counseling is recommended for this patient.      Diagnostic Summary    * LM has 0% stenosis.    * LAD has 0% stenosis.    * CX has 0% stenosis.    * pRCA to dRCA: Minimal luminal irregularities, GORDON: 3 flow.    * Coronary angiography shows right dominance.          METS/Exercise Tolerance:  >4 METS   Hematologic:  - neg hematologic  ROS      (-) history of blood clots   Musculoskeletal:  - neg musculoskeletal ROS       GI/Hepatic:  - neg  GI/hepatic ROS       Renal/Genitourinary:  - ROS Renal section negative       Endo:     (+) type II DM Last HgA1c: 5.6 date: 2/22/2018 Not using insulin - not using insulin pump Normal glucose range: 7 day average = 112, 30 day average = 115 not previously admitted for DM/DKA Obesity, .      Psychiatric:     (+) psychiatric history other (comment) (alcohol dependence in remission)      Infectious Disease:  - neg infectious disease ROS       Malignancy:      - no malignancy   Other:    (+) no H/O Chronic Pain,  - neg other ROS                 Physical Exam  Normal systems: cardiovascular and pulmonary    Airway   Mallampati: II  TM distance: >3 FB  Neck ROM: full    Dental   (+) partials  Comment: Upper and lower partials    Cardiovascular   Rhythm and rate: regular and normal      Pulmonary    breath sounds clear to auscultation               PAC Discussion and Assessment    ASA Classification: 3  Case is suitable for: [place]  Anesthetic techniques and relevant risks discussed: GA  Invasive monitoring and risk discussed:   Types:   Possibility and Risk of blood transfusion discussed:   NPO instructions given:   Additional anesthetic preparation and risks discussed:   Needs early admission to pre-op area:   Other:     PAC Resident/NP Anesthesia Assessment:  Deng Oconnell is a 57 year old male scheduled for a Flexible Bronchoscopy, Right Thorascopic Right Upper Lobe Wedge Resection, Possible Segmentectomy, Possible Right Lobectomy on 3/5/2018 by Cruzito Main and Collins in treatment of a lung nodule.  PAC referral for risk assessment and optimization for anesthesia with comorbid conditions of: hypertension, cardiomyopathy, frequent PVCs, obesity, history of smoking, diabetes and alcohol dependence in remission.      Pre-operative considerations:  1.  Cardiac:  Functional status- METS >4.  He has lost 60lbs since this past summer by starting to exercise more regularly.  He walks 30 minutes at a time and uses his stationary  bicycle 45-60 minutes.  He is on Toprol Xl due to having frequent PVCs.  He was evaluated thoroughly by cardiology at [facility] this past fall and it was determined that he had cardiomyopathy secondary to high PVC burden.  He was started on the Toprol XL and follow up holter monitoring showed a decrease in the PVC burden.  They have recommended follow up echo in May 2018 for the cardiomyopathy.   The EF on the stress echo in Sept 2017 was 45-50%.  Cardiac cath in October 2017 showed non-obstructive CAD.  High risk surgery with 0.9% risk of major adverse cardiac event.   2.  Pulm:  Airway feasible.  HARVEY risk: intermediate.  He quit smoking in 2005.  He is obese with a BMI >30.  3.  GI:  Risk of PONV score = 2.  If > 2, anti-emetic intervention recommended.    4. 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  At that time his A1c was >8.  He started exercising more, changed his diet and started metformin.  He reports that his last A1c had decreased to 5.8. Instructed to hold metformin the DOS.  5. Heme:  Instructed to hold his aspirin 7 days prior to surgery.   His platelet count is slightly low at 138.   6. Psych:  Alcohol dependence in remission since 1997.       VTE risk:  0.5%    Patient is optimized and is acceptable candidate for the proposed procedure.  No further diagnostic evaluation is needed.     Patient also evaluated by Dr. Reid. See recommendations below.     For further details of assessment, testing, and physical exam please see H and P completed on same date.          Michelle Barajas DNP, RN, APRN      Reviewed and Signed by PAC Mid-Level Provider/Resident  Mid-Level Provider/Resident: Michelle Barajas DNP, RN, APRN  Date: 2/2/2018  Time: 1142    Attending Anesthesiologist Anesthesia Assessment:  I have examined the patient and reviewed the medical record.  I have discussed the patient with the KOTA and concur with her assessment  The patient is  scheduled for VATS resection of right lobe mass found on screening CT (asymptomatic but significant remote smoking history)  He has no other pulmonary pathology and uses no pulmonary medication or oxygen.  The patient had cardiac w/u for significant PVC load (20% on holter).  Stress echo 9/2017 demonstrated EF 45-50%, demonstrated no ischemia but did have frequent ectopy including 4 beat run PVCs.  Proceeded to Cath 10/2017 which demonstrated no obstructive disease.  He was started on toprolol and repeat holter demonstrated marked reduction in PVC load.  He was diagnosed with NIDDM but this has markedly improved with weight loss - HgbA1c 5.8    PE:  Pleasant male in NAD.  MPC 2, limited extension, thin neck, 3 FBTMD.  Lungs clear.  CV  RRR without ectopy    No further testing necessary per protocol.  Final plan per attending anesthesiologist the day of surgery.  Discussed possbile arterial line given history of cardiomyopathy from ectopy    Reviewed and Signed by PAC Anesthesiologist  Anesthesiologist: Lino Reid MD  Date: 2/22/2018  Time:   Pass/Fail:   Disposition:     PAC Pharmacist Assessment:        Pharmacist:   Date:   Time:                           .

## 2018-02-23 ENCOUNTER — TEAM CONFERENCE (OUTPATIENT)
Dept: SURGERY | Facility: CLINIC | Age: 58
End: 2018-02-23

## 2018-02-23 DIAGNOSIS — R91.1 LUNG NODULE: Primary | ICD-10-CM

## 2018-02-23 NOTE — TELEPHONE ENCOUNTER
Pulmonary Nodule Conference      Patient Name: Deng Oconnell    Reason for conference discussion (brief overview): 58 yo male with RUL nodule of unknown etiology. Patient is a former smoker. 60 pack years, quit 13 years ago. Unable to obtain biopsy via percutaneous or bronchoscopic approach due to location of nodule. Surgical biopsy would be a RUL posterior segmentectomy possible lobectomy.     Specific Question:  Is a major surgery the best option for a nodule that has been stable for 7 months?    Pertinent Histology:  None    Referring Physician: Dr. Estefany Main    The patient's case was presented at the multidisciplinary conference for the above noted reason.  There was a consensus recommendation for the following actions:     The nodule has been stable for 7 months and it is certainly reasonable to continue to monitor it with serial CTs. Dr. Main will talk with the patient to determine his level of anxiety as well as his willingness to undergo a major procedure for a potentially benign process.           Case Lead:  Hortensia Valdes    Interventional Radiology Staff Present: Misbah Blood MD

## 2018-02-23 NOTE — PROGRESS NOTES
Attempted to contact the patient with results multiple times, but no answer.  Message left with PAC # to call for results, but also put a copy in the mail as he had requested.  All labs are good for surgery.      Thank you!  Michelle Barajas DNP, RN, ANP-C

## 2018-02-26 ENCOUNTER — TELEPHONE (OUTPATIENT)
Dept: SURGERY | Facility: CLINIC | Age: 58
End: 2018-02-26

## 2018-02-26 NOTE — TELEPHONE ENCOUNTER
I called Mr. Oconnell to convey to him the discussion we had at our multidisciplinary nodule conference.  This nodule has been stable for 7 months now with minimal PET activity. While there is a possibility that it could be cancer, there is a good chance that we could be doing a complex segmentectomy or possibly a lobectomy for diagnosis and this not be cancer.  Even if it is a cancer, it appears to be indolent  I told him that the consensus at the nodule conference was that watchful waiting was a reasonable plan.  We would follow up with him in clinic in 6 months with a new CT.  He will discuss with his family and get back to us.

## 2018-02-27 ENCOUNTER — DOCUMENTATION ONLY (OUTPATIENT)
Dept: SURGERY | Facility: CLINIC | Age: 58
End: 2018-02-27

## 2018-02-27 ENCOUNTER — TELEPHONE (OUTPATIENT)
Dept: SURGERY | Facility: CLINIC | Age: 58
End: 2018-02-27

## 2018-02-27 DIAGNOSIS — R91.1 LUNG NODULE: Primary | ICD-10-CM

## 2018-02-27 NOTE — TELEPHONE ENCOUNTER
Call to Deng to inquire about whether he wants to go forward with surgery. On one hand, he wants to have the surgery just to know once and for all. On the other hand, he understands that the surgeons and other providers here feel that we could safely watch this nodule given it has shown 7 months of stability.    He is understandably confused about what to do however, he feels confident following the suggestion of his health care team.     I will cancel his surgery and get him scheduled to follow up with Dr. Fried with an updated chest CT 6 months from his last CT. Deng was in agreement with this plan. He has my direct number and will call if he has further questions or concerns.

## 2018-02-28 LAB
DLCOUNC-%PRED-PRE: 86 %
DLCOUNC-PRE: 24.55 ML/MIN/MMHG
DLCOUNC-PRED: 28.51 ML/MIN/MMHG
ERV-%PRED-PRE: 167 %
ERV-PRE: 1.33 L
ERV-PRED: 0.79 L
EXPTIME-PRE: 10.64 SEC
FEF2575-%PRED-POST: 55 %
FEF2575-%PRED-PRE: 50 %
FEF2575-POST: 1.71 L/SEC
FEF2575-PRE: 1.55 L/SEC
FEF2575-PRED: 3.06 L/SEC
FEFMAX-%PRED-PRE: 70 %
FEFMAX-PRE: 6.4 L/SEC
FEFMAX-PRED: 9.1 L/SEC
FEV1-%PRED-PRE: 78 %
FEV1-PRE: 2.78 L
FEV1FEV6-PRE: 69 %
FEV1FEV6-PRED: 79 %
FEV1FVC-PRE: 66 %
FEV1FVC-PRED: 78 %
FEV1SVC-PRE: 66 %
FEV1SVC-PRED: 74 %
FIFMAX-PRE: 6.06 L/SEC
FRCPLETH-%PRED-PRE: 126 %
FRCPLETH-PRE: 4.41 L
FRCPLETH-PRED: 3.49 L
FVC-%PRED-PRE: 92 %
FVC-PRE: 4.18 L
FVC-PRED: 4.52 L
IC-%PRED-PRE: 72 %
IC-PRE: 2.9 L
IC-PRED: 4.01 L
RVPLETH-%PRED-PRE: 133 %
RVPLETH-PRE: 3.08 L
RVPLETH-PRED: 2.3 L
TLCPLETH-%PRED-PRE: 107 %
TLCPLETH-PRE: 7.31 L
TLCPLETH-PRED: 6.82 L
VA-%PRED-PRE: 80 %
VA-PRE: 5.25 L
VC-%PRED-PRE: 88 %
VC-PRE: 4.23 L
VC-PRED: 4.8 L

## 2018-09-11 ENCOUNTER — OFFICE VISIT (OUTPATIENT)
Dept: PULMONOLOGY | Facility: CLINIC | Age: 58
End: 2018-09-11
Attending: INTERNAL MEDICINE
Payer: COMMERCIAL

## 2018-09-11 ENCOUNTER — RADIANT APPOINTMENT (OUTPATIENT)
Dept: CT IMAGING | Facility: CLINIC | Age: 58
End: 2018-09-11
Attending: CLINICAL NURSE SPECIALIST
Payer: COMMERCIAL

## 2018-09-11 VITALS
HEIGHT: 68 IN | TEMPERATURE: 97.4 F | BODY MASS INDEX: 35.92 KG/M2 | OXYGEN SATURATION: 96 % | HEART RATE: 58 BPM | WEIGHT: 237 LBS | DIASTOLIC BLOOD PRESSURE: 83 MMHG | SYSTOLIC BLOOD PRESSURE: 130 MMHG

## 2018-09-11 DIAGNOSIS — R91.1 LUNG NODULE: ICD-10-CM

## 2018-09-11 DIAGNOSIS — R91.8 PULMONARY NODULES: Primary | ICD-10-CM

## 2018-09-11 PROCEDURE — G0463 HOSPITAL OUTPT CLINIC VISIT: HCPCS | Mod: ZF

## 2018-09-11 ASSESSMENT — PAIN SCALES - GENERAL: PAINLEVEL: NO PAIN (0)

## 2018-09-11 NOTE — MR AVS SNAPSHOT
"              After Visit Summary   9/11/2018    Deng Oconnell    MRN: [id number]           Patient Information     Date Of Birth          1960        Visit Information        Provider Department      9/11/2018 9:30 AM Jono Fried MD [facility]        Today's Diagnoses     Pulmonary nodules    -  1       Follow-ups after your visit        Follow-up notes from your care team     Return in about 9 months (around 6/11/2019).      Future tests that were ordered for you today     Open Future Orders        Priority Expected Expires Ordered    CT Chest w/o contrast Routine  9/11/2019 9/11/2018            Who to contact     If you have questions or need follow up information about today's clinic visit or your schedule please contact [facility] directly at [phone].  Normal or non-critical lab and imaging results will be communicated to you by MyChart, letter or phone within 4 business days after the clinic has received the results. If you do not hear from us within 7 days, please contact the clinic through MyChart or phone. If you have a critical or abnormal lab result, we will notify you by phone as soon as possible.  Submit refill requests through "[employer] or call your pharmacy and they will forward the refill request to us. Please allow 3 business days for your refill to be completed.          Additional Information About Your Visit        Care EveryWhere ID     This is your Care EveryWhere ID. This could be used by other organizations to access your [place] medical records  [id number]        Your Vitals Were     Pulse Temperature Height Pulse Oximetry BMI (Body Mass Index)       58 97.4  F (36.3  C) (Oral) 1.727 m (5' 7.99\") 96% 36.05 kg/m2        Blood Pressure from Last 3 Encounters:   09/11/18 130/83   02/22/18 144/83   02/22/18 144/83    Weight from Last 3 Encounters:   09/11/18 107.5 kg (237 lb)   02/22/18 103.6 kg (228 lb 8 oz)   02/22/18 103.6 kg (228 lb 8 oz) "               Primary Care Provider Fax #    Physician No Ref-Primary [phone]       No address on file        Equal Access to Services     AMANDA SHIN : Hadii aad ku hadmoralesrafita Ektacamden, elisa yedemetraha, betsey alejandro antonioregla, sangeeta ariain hayaajorge alvarezraciel anne michael kerr. So [facility] [phone].    ATENCIÓN: Si habla español, tiene a reardon disposición servicios gratuitos de asistencia lingüística. Llame al [phone].    We comply with applicable federal civil rights laws and [place] laws. We do not discriminate on the basis of race, color, national origin, age, disability, sex, sexual orientation, or gender identity.            Thank you!     Thank you for choosing [facility]  for your care. Our goal is always to provide you with excellent care. Hearing back from our patients is one way we can continue to improve our services. Please take a few minutes to complete the written survey that you may receive in the mail after your visit with us. Thank you!             Your Updated Medication List - Protect others around you: Learn how to safely use, store and throw away your medicines at www.disposemymeds.org.          This list is accurate as of 9/11/18  9:59 AM.  Always use your most recent med list.                   Brand Name Dispense Instructions for use Diagnosis    ASPIRIN PO      Take 81 mg by mouth every morning        metFORMIN 500 MG tablet    GLUCOPHAGE     Take 500 mg by mouth 2 times daily (with meals)        metoprolol succinate 25 MG 24 hr tablet    TOPROL-XL     Take 25 mg by mouth every morning

## 2018-09-11 NOTE — LETTER
Date:September 12, 2018      Patient was self referred, no letter generated. Do not send.        [facility] Health Information

## 2018-09-11 NOTE — PROGRESS NOTES
LUNG NODULE & INTERVENTIONAL PULMONARY CLINIC  [facility]     Deng Oconnell MRN# [id number]   Age: 58 year old YOB: 1960     Reason for Consultation: lung nodule(s)    Requesting Physician: Referred MD Van  No address on file       Assessment and Plan:    1. Established solitary pulmonary lung nodule(s). Given the characteristics on current/previous imaging and risk factors; I would classify this to be Intermediate (6-65%) risk for cancer. In my view, no interval increase in the RUL nodule. Plan next CT in 9mo.     Billing: I spent more than 30 minutes face to face and greater than 50% of time was for counseling and coordination of care about the issues above.      Jono Fried MD   of Medicine  Interventional Pulmonology  Department of Pulmonary, Allergy, Critical Care and Sleep Medicine   [facility]  Pager: [phone]          History:     Mr. Oconnell is a 57M with right lung nodule back for f/u.   - No new respiratory sx. Quit tobacco ~15yrs ago.   - Has a 1.1 cm RUL nodule that has been w/u with PET and discussed at nodule conference. The SUV is >3.5. Recently saw thoracic surgery and given that this would be an extensive procedure, plan was to f/u another short-term CT.   - He denies CP, dyspnea, hemoptysis.   - Exposure hx: Denies asbestos or radon exposure   - Tobacco hx: Past Smoker: >60pkyr tobacco history quit 2004   - My interpretation of the images relevant for this visit includes: no interval change in RUL nodule   - My interpretation of the PFT's relevant for this visit includes: Normal     Culprit Nodule(s):   1: Right upper lobe nodule and is 13x8 mm in size/severity and non-calcified in morphology/quality. First seen by chest CT on 2017. There is no interval change.    Other active medical problems include:   - has DM. Stable on metformin          Past Medical History:       Past Medical History:   Diagnosis Date     Alcohol dependence in remission (H)      Diabetes (H)      Frequent PVCs      History of smoking      Hypertension      Obesity            Past Surgical History:      Past Surgical History:   Procedure Laterality Date     ARTHROSCOPY KNEE  1979     HERNIA REPAIR       patellar tendon repair            Social History:     Social History   Substance Use Topics     Smoking status: Former Smoker     Packs/day: 3.00     Years: 33.00     Types: Cigarettes     Quit date: 8/25/2005     Smokeless tobacco: Never Used     Alcohol use No          Family History:     Family History   Problem Relation Age of Onset     Heart Failure Mother      Lung Cancer Father      Emphysema Father      No Known Problems Sister      No Known Problems Brother      Unknown/Adopted Maternal Grandmother      Unknown/Adopted Maternal Grandfather      Unknown/Adopted Paternal Grandmother      No Known Problems Sister            Allergies:    No Known Allergies       Medications:     Current Outpatient Prescriptions   Medication Sig     ASPIRIN PO Take 81 mg by mouth every morning      metFORMIN (GLUCOPHAGE) 500 MG tablet Take 500 mg by mouth 2 times daily (with meals)     metoprolol (TOPROL-XL) 25 MG 24 hr tablet Take 25 mg by mouth every morning      No current facility-administered medications for this visit.           Review of Systems:     CONSTITUTIONAL: negative for fever, chills, change in weight  INTEGUMENTARY/SKIN: no rash or obvious new lesions  ENT/MOUTH: no sore throat, new sinus pain or nasal drainage  RESP: see interval history  CV: negative for chest pain, palpitations or peripheral edema  GI: no nausea, vomiting, change in stools  : no dysuria  MUSCULOSKELETAL: no myalgias, arthralgias  ENDOCRINE: blood sugars with adequate control  PSYCHIATRIC: mood stable  LYMPHATIC: no new lymphadenopathy  HEME: no bleeding or easy bruisability  NEURO: no numbness, weakness, headaches         Physical  Exam:     Temp:  [97.4  F (36.3  C)] 97.4  F (36.3  C)  Pulse:  [58] 58  BP: (130)/(83) 130/83  SpO2:  [96 %] 96 %  Wt Readings from Last 4 Encounters:   09/11/18 107.5 kg (237 lb)   02/22/18 103.6 kg (228 lb 8 oz)   02/22/18 103.6 kg (228 lb 8 oz)   01/08/18 105.7 kg (233 lb 0.4 oz)     Constitutional:   Awake, alert and in no apparent distress     Eyes:   Nonicteric, RAJAN     ENT:    Trachea is midline. No gross neck abnormalities      Neck:   Supple without supraclavicular or cervical lymphadenopathy     Lungs:   Good air flow.  No crackles. No rhonchi.  No wheezes.     Cardiovascular:   Normal S1 and S2.  RRR.  No murmur, gallop or rub.  Radial, DP and PT pulses normal and symmetric     Abdomen:   NABS, soft, nontender, nondistended.  No HSM.     Musculoskeletal:   No edema.      Neurologic:   Alert and conversant. Cranial nerves  intact.       Skin:   Warm, dry.  No rash on limited exam.           Current Laboratory Data:   All laboratory and imaging data reviewed.    No results found for this or any previous visit (from the past 24 hour(s)).         Previous Pulmonary Function Testing     FVC-Pred   Date Value Ref Range Status   02/22/2018 4.52 L      FVC-Pre   Date Value Ref Range Status   02/22/2018 4.18 L      FVC-%Pred-Pre   Date Value Ref Range Status   02/22/2018 92 %      FEV1-Pre   Date Value Ref Range Status   02/22/2018 2.78 L      FEV1-%Pred-Pre   Date Value Ref Range Status   02/22/2018 78 %      FEV1FVC-Pred   Date Value Ref Range Status   02/22/2018 78 %      FEV1FVC-Pre   Date Value Ref Range Status   02/22/2018 66 %      No results found for: 20029  FEFMax-Pred   Date Value Ref Range Status   02/22/2018 9.10 L/sec      FEFMax-Pre   Date Value Ref Range Status   02/22/2018 6.40 L/sec      FEFMax-%Pred-Pre   Date Value Ref Range Status   02/22/2018 70 %      ExpTime-Pre   Date Value Ref Range Status   02/22/2018 10.64 sec      FIFMax-Pre   Date Value Ref Range Status   02/22/2018 6.06 L/sec       FEV1FEV6-Pred   Date Value Ref Range Status   02/22/2018 79 %      FEV1FEV6-Pre   Date Value Ref Range Status   02/22/2018 69 %             Previous Cardiology Imaging   No results found for this or any previous visit (from the past 8760 hour(s)).

## 2018-09-11 NOTE — NURSING NOTE
"Oncology Rooming Note    September 11, 2018 9:19 AM   Deng Oconnell is a 58 year old male who presents for:    No chief complaint on file.    Initial Vitals: /83  Pulse 58  Temp 97.4  F (36.3  C) (Oral)  Ht 1.727 m (5' 7.99\")  Wt 107.5 kg (237 lb)  SpO2 96%  BMI 36.05 kg/m2 Estimated body mass index is 36.05 kg/(m^2) as calculated from the following:    Height as of this encounter: 1.727 m (5' 7.99\").    Weight as of this encounter: 107.5 kg (237 lb). Body surface area is 2.27 meters squared.  No Pain (0) Comment: Data Unavailable   No LMP for male patient.  Allergies reviewed: Yes  Medications reviewed: Yes    Medications: Medication refills not needed today.  Pharmacy name entered into [employer]: [facility]    Clinical concerns: CT scan completed. Dr Fried was notified.    8 minutes for nursing intake (face to face time)     Eli Dominguez CMA                "

## 2018-09-11 NOTE — LETTER
9/11/2018       RE: Deng Oconnell  [address] [place] [address]     Dear Colleague,    Thank you for referring your patient, Deng Oconnell, to the [facility]CER [facility] at [facility]. Please see a copy of my visit note below.    LUNG NODULE & INTERVENTIONAL PULMONARY CLINIC  CLINICS & SURGERY [facility], [facility]     Deng Oconnell MRN# [id number]   Age: 58 year old YOB: 1960     Reason for Consultation: lung nodule(s)    Requesting Physician: Referred Self, MD  No address on file       Assessment and Plan:    1. Established solitary pulmonary lung nodule(s). Given the characteristics on current/previous imaging and risk factors; I would classify this to be Intermediate (6-65%) risk for cancer. In my view, no interval increase in the RUL nodule. Plan next CT in 9mo.     Billing: I spent more than 30 minutes face to face and greater than 50% of time was for counseling and coordination of care about the issues above.      Jono Fried MD   of Medicine  Interventional Pulmonology  Department of Pulmonary, Allergy, Critical Care and Sleep Medicine   [facility]  Pager: [phone]          History:     Mr. Oconnell is a 57M with right lung nodule back for f/u.   - No new respiratory sx. Quit tobacco ~15yrs ago.   - Has a 1.1 cm RUL nodule that has been w/u with PET and discussed at nodule conference. The SUV is >3.5. Recently saw thoracic surgery and given that this would be an extensive procedure, plan was to f/u another short-term CT.   - He denies CP, dyspnea, hemoptysis.   - Exposure hx: Denies asbestos or radon exposure   - Tobacco hx: Past Smoker: >60pkyr tobacco history quit 2004   - My interpretation of the images relevant for this visit includes: no interval change in RUL nodule   - My interpretation of the PFT's relevant for this visit includes: Normal     Culprit  Nodule(s):   1: Right upper lobe nodule and is 13x8 mm in size/severity and non-calcified in morphology/quality. First seen by chest CT on 2017. There is no interval change.    Other active medical problems include:   - has DM. Stable on metformin          Past Medical History:      Past Medical History:   Diagnosis Date     Alcohol dependence in remission (H)      Diabetes (H)      Frequent PVCs      History of smoking      Hypertension      Obesity            Past Surgical History:      Past Surgical History:   Procedure Laterality Date     ARTHROSCOPY KNEE  1979     HERNIA REPAIR       patellar tendon repair            Social History:     Social History   Substance Use Topics     Smoking status: Former Smoker     Packs/day: 3.00     Years: 33.00     Types: Cigarettes     Quit date: 8/25/2005     Smokeless tobacco: Never Used     Alcohol use No          Family History:     Family History   Problem Relation Age of Onset     Heart Failure Mother      Lung Cancer Father      Emphysema Father      No Known Problems Sister      No Known Problems Brother      Unknown/Adopted Maternal Grandmother      Unknown/Adopted Maternal Grandfather      Unknown/Adopted Paternal Grandmother      No Known Problems Sister            Allergies:    No Known Allergies       Medications:     Current Outpatient Prescriptions   Medication Sig     ASPIRIN PO Take 81 mg by mouth every morning      metFORMIN (GLUCOPHAGE) 500 MG tablet Take 500 mg by mouth 2 times daily (with meals)     metoprolol (TOPROL-XL) 25 MG 24 hr tablet Take 25 mg by mouth every morning      No current facility-administered medications for this visit.           Review of Systems:     CONSTITUTIONAL: negative for fever, chills, change in weight  INTEGUMENTARY/SKIN: no rash or obvious new lesions  ENT/MOUTH: no sore throat, new sinus pain or nasal drainage  RESP: see interval history  CV: negative for chest pain, palpitations or peripheral edema  GI: no nausea, vomiting,  change in stools  : no dysuria  MUSCULOSKELETAL: no myalgias, arthralgias  ENDOCRINE: blood sugars with adequate control  PSYCHIATRIC: mood stable  LYMPHATIC: no new lymphadenopathy  HEME: no bleeding or easy bruisability  NEURO: no numbness, weakness, headaches         Physical Exam:     Temp:  [97.4  F (36.3  C)] 97.4  F (36.3  C)  Pulse:  [58] 58  BP: (130)/(83) 130/83  SpO2:  [96 %] 96 %  Wt Readings from Last 4 Encounters:   09/11/18 107.5 kg (237 lb)   02/22/18 103.6 kg (228 lb 8 oz)   02/22/18 103.6 kg (228 lb 8 oz)   01/08/18 105.7 kg (233 lb 0.4 oz)     Constitutional:   Awake, alert and in no apparent distress     Eyes:   Nonicteric, RAJAN     ENT:    Trachea is midline. No gross neck abnormalities      Neck:   Supple without supraclavicular or cervical lymphadenopathy     Lungs:   Good air flow.  No crackles. No rhonchi.  No wheezes.     Cardiovascular:   Normal S1 and S2.  RRR.  No murmur, gallop or rub.  Radial, DP and PT pulses normal and symmetric     Abdomen:   NABS, soft, nontender, nondistended.  No HSM.     Musculoskeletal:   No edema.      Neurologic:   Alert and conversant. Cranial nerves  intact.       Skin:   Warm, dry.  No rash on limited exam.           Current Laboratory Data:   All laboratory and imaging data reviewed.    No results found for this or any previous visit (from the past 24 hour(s)).         Previous Pulmonary Function Testing     FVC-Pred   Date Value Ref Range Status   02/22/2018 4.52 L      FVC-Pre   Date Value Ref Range Status   02/22/2018 4.18 L      FVC-%Pred-Pre   Date Value Ref Range Status   02/22/2018 92 %      FEV1-Pre   Date Value Ref Range Status   02/22/2018 2.78 L      FEV1-%Pred-Pre   Date Value Ref Range Status   02/22/2018 78 %      FEV1FVC-Pred   Date Value Ref Range Status   02/22/2018 78 %      FEV1FVC-Pre   Date Value Ref Range Status   02/22/2018 66 %      No results found for: 20029  FEFMax-Pred   Date Value Ref Range Status   02/22/2018 9.10 L/sec       FEFMax-Pre   Date Value Ref Range Status   02/22/2018 6.40 L/sec      FEFMax-%Pred-Pre   Date Value Ref Range Status   02/22/2018 70 %      ExpTime-Pre   Date Value Ref Range Status   02/22/2018 10.64 sec      FIFMax-Pre   Date Value Ref Range Status   02/22/2018 6.06 L/sec      FEV1FEV6-Pred   Date Value Ref Range Status   02/22/2018 79 %      FEV1FEV6-Pre   Date Value Ref Range Status   02/22/2018 69 %             Previous Cardiology Imaging   No results found for this or any previous visit (from the past 8760 hour(s)).               Again, thank you for allowing me to participate in the care of your patient.      Sincerely,    Jono Fried MD

## 2018-09-12 ENCOUNTER — TELEPHONE (OUTPATIENT)
Dept: PULMONOLOGY | Facility: CLINIC | Age: 58
End: 2018-09-12

## 2019-04-23 ENCOUNTER — DOCUMENTATION ONLY (OUTPATIENT)
Dept: CARE COORDINATION | Facility: CLINIC | Age: 59
End: 2019-04-23

## 2019-06-11 ENCOUNTER — ANCILLARY PROCEDURE (OUTPATIENT)
Dept: CT IMAGING | Facility: CLINIC | Age: 59
End: 2019-06-11
Attending: INTERNAL MEDICINE
Payer: COMMERCIAL

## 2019-06-11 ENCOUNTER — OFFICE VISIT (OUTPATIENT)
Dept: PULMONOLOGY | Facility: CLINIC | Age: 59
End: 2019-06-11
Attending: INTERNAL MEDICINE
Payer: COMMERCIAL

## 2019-06-11 VITALS
RESPIRATION RATE: 16 BRPM | DIASTOLIC BLOOD PRESSURE: 84 MMHG | TEMPERATURE: 98.2 F | OXYGEN SATURATION: 96 % | HEART RATE: 55 BPM | SYSTOLIC BLOOD PRESSURE: 154 MMHG | HEIGHT: 68 IN | BODY MASS INDEX: 40.36 KG/M2 | WEIGHT: 266.3 LBS

## 2019-06-11 DIAGNOSIS — R91.8 PULMONARY NODULES: Primary | ICD-10-CM

## 2019-06-11 DIAGNOSIS — R91.8 PULMONARY NODULES: ICD-10-CM

## 2019-06-11 PROCEDURE — G0463 HOSPITAL OUTPT CLINIC VISIT: HCPCS | Mod: ZF

## 2019-06-11 ASSESSMENT — PAIN SCALES - GENERAL: PAINLEVEL: NO PAIN (0)

## 2019-06-11 ASSESSMENT — MIFFLIN-ST. JEOR: SCORE: 2002.27

## 2019-06-11 NOTE — LETTER
Date:June 12, 2019      Patient was self referred, no letter generated. Do not send.        [facility] Health Information

## 2019-06-11 NOTE — LETTER
6/11/2019       RE: Deng Oconnell  [address] [place] [address]     Dear Colleague,    Thank you for referring your patient, Deng Oconnell, to the [facility] CANCER CLINIC at [facility]. Please see a copy of my visit note below.    LUNG NODULE & INTERVENTIONAL PULMONARY CLINIC  [facility]     Deng Oconnell MRN# [id number]   Age: 59 year old YOB: 1960     Reason for Consultation: lung nodule(s)     Assessment and Plan:    1. Established solitary pulmonary lung nodule(s). Given the characteristics on current/previous imaging and risk factors; I would classify this to be Intermediate (6-65%) risk for cancer. RUL 13mm nodules stable since 2017 however showed FDG activity 2.63. He has had 2yr stability however this still remains indeterminate. Given risk factors and FDG activity, I would cont yearly surveillance for another 2yrs. He is agreeable. Next CT in 12mo.     Billing: I spent more than 30 minutes face to face and greater than 50% of time was for counseling and coordination of care about the issues above.    Jono Fried MD   of Medicine  Interventional Pulmonology  Department of Pulmonary, Allergy, Critical Care and Sleep Medicine   [facility]  Pager: [phone]          History:     Mr. Oconnell is a 57M with right lung nodule back for f/u.   - No new respiratory sx.   - Has a 1.1 cm RUL nodule that has been w/u with PET and discussed at nodule conference. The SUV is 2.63. Recently saw thoracic surgery and given that this would be an extensive procedure, plan was to f/u another short-term CT.   - He denies CP, dyspnea, hemoptysis.   - Exposure hx: Denies asbestos or radon exposure   - Tobacco hx: Past Smoker: >60pkyr tobacco history quit 2004   - My interpretation of the images relevant for this visit includes: no interval change in RUL nodule   -  My interpretation of the PFT's relevant for this visit includes: Normal      Culprit Nodule(s):   1: Right upper lobe nodule and is 13x8 mm in size/severity and non-calcified in morphology/quality. First seen by chest CT on 2017. There is no interval change.     Other active medical problems include:   - has DM. Stable on metformin          Past Medical History:      Past Medical History:   Diagnosis Date     Alcohol dependence in remission (H)      Diabetes (H)      Frequent PVCs      History of smoking      Hypertension      Obesity            Past Surgical History:      Past Surgical History:   Procedure Laterality Date     ARTHROSCOPY KNEE  1979     HERNIA REPAIR       patellar tendon repair            Social History:     Social History     Tobacco Use     Smoking status: Former Smoker     Packs/day: 3.00     Years: 33.00     Pack years: 99.00     Types: Cigarettes     Last attempt to quit: 2005     Years since quittin.8     Smokeless tobacco: Never Used   Substance Use Topics     Alcohol use: No          Family History:     Family History   Problem Relation Age of Onset     Heart Failure Mother      Lung Cancer Father      Emphysema Father      No Known Problems Sister      No Known Problems Brother      Unknown/Adopted Maternal Grandmother      Unknown/Adopted Maternal Grandfather      Unknown/Adopted Paternal Grandmother      No Known Problems Sister            Allergies:    No Known Allergies       Medications:     Current Outpatient Medications   Medication Sig     metFORMIN (GLUCOPHAGE) 500 MG tablet Take 500 mg by mouth 2 times daily (with meals)     metoprolol (TOPROL-XL) 25 MG 24 hr tablet Take 25 mg by mouth every morning      ASPIRIN PO Take 81 mg by mouth every morning      No current facility-administered medications for this visit.           Review of Systems:     CONSTITUTIONAL: negative for fever, chills, change in weight  INTEGUMENTARY/SKIN: no rash or obvious new lesions  ENT/MOUTH:  no sore throat, new sinus pain or nasal drainage  RESP: see interval history  CV: negative for chest pain, palpitations or peripheral edema  GI: no nausea, vomiting, change in stools  : no dysuria  MUSCULOSKELETAL: no myalgias, arthralgias  ENDOCRINE: blood sugars with adequate control  PSYCHIATRIC: mood stable  LYMPHATIC: no new lymphadenopathy  HEME: no bleeding or easy bruisability  NEURO: no numbness, weakness, headaches         Physical Exam:     Temp:  [98.2  F (36.8  C)] 98.2  F (36.8  C)  Pulse:  [55] 55  Resp:  [16] 16  BP: (154)/(84) 154/84  SpO2:  [96 %] 96 %  Wt Readings from Last 4 Encounters:   06/11/19 120.8 kg (266 lb 4.8 oz)   09/11/18 107.5 kg (237 lb)   02/22/18 103.6 kg (228 lb 8 oz)   02/22/18 103.6 kg (228 lb 8 oz)     Constitutional:   Awake, alert and in no apparent distress     Eyes:   Nonicteric, RAJAN     ENT:    Trachea is midline. No gross neck abnormalities      Neck:   Supple without supraclavicular or cervical lymphadenopathy     Lungs:   Good air flow.  No crackles. No rhonchi.  No wheezes.     Cardiovascular:   Normal S1 and S2.  RRR.  No murmur, gallop or rub.  Radial, DP and PT pulses normal and symmetric     Abdomen:   NABS, soft, nontender, nondistended.  No HSM.     Musculoskeletal:   No edema.      Neurologic:   Alert and conversant. Cranial nerves  intact.       Skin:   Warm, dry.  No rash on limited exam.           Current Laboratory Data:   All laboratory and imaging data reviewed.    No results found for this or any previous visit (from the past 24 hour(s)).         Previous Pulmonary Function Testing     FVC-Pred   Date Value Ref Range Status   02/22/2018 4.52 L      FVC-Pre   Date Value Ref Range Status   02/22/2018 4.18 L      FVC-%Pred-Pre   Date Value Ref Range Status   02/22/2018 92 %      FEV1-Pre   Date Value Ref Range Status   02/22/2018 2.78 L      FEV1-%Pred-Pre   Date Value Ref Range Status   02/22/2018 78 %      FEV1FVC-Pred   Date Value Ref Range Status    02/22/2018 78 %      FEV1FVC-Pre   Date Value Ref Range Status   02/22/2018 66 %      No results found for: 20029  FEFMax-Pred   Date Value Ref Range Status   02/22/2018 9.10 L/sec      FEFMax-Pre   Date Value Ref Range Status   02/22/2018 6.40 L/sec      FEFMax-%Pred-Pre   Date Value Ref Range Status   02/22/2018 70 %      ExpTime-Pre   Date Value Ref Range Status   02/22/2018 10.64 sec      FIFMax-Pre   Date Value Ref Range Status   02/22/2018 6.06 L/sec      FEV1FEV6-Pred   Date Value Ref Range Status   02/22/2018 79 %      FEV1FEV6-Pre   Date Value Ref Range Status   02/22/2018 69 %             Previous Cardiology Imaging   No results found for this or any previous visit (from the past 8760 hour(s)).               Again, thank you for allowing me to participate in the care of your patient.      Sincerely,    Jono Fried MD

## 2019-06-11 NOTE — PROGRESS NOTES
LUNG NODULE & INTERVENTIONAL PULMONARY CLINIC  CLINICS & SURGERY CENTER, [facility], [facility]     Deng Oconnell MRN# [id number]   Age: 59 year old YOB: 1960     Reason for Consultation: lung nodule(s)     Assessment and Plan:    1. Established solitary pulmonary lung nodule(s). Given the characteristics on current/previous imaging and risk factors; I would classify this to be Intermediate (6-65%) risk for cancer. RUL 13mm nodules stable since 2017 however showed FDG activity 2.63. He has had 2yr stability however this still remains indeterminate. Given risk factors and FDG activity, I would cont yearly surveillance for another 2yrs. He is agreeable. Next CT in 12mo.     Billing: I spent more than 30 minutes face to face and greater than 50% of time was for counseling and coordination of care about the issues above.    Jono Fried MD   of Medicine  Interventional Pulmonology  Department of Pulmonary, Allergy, Critical Care and Sleep Medicine   [facility]  Pager: [phone]          History:     Mr. Oconnell is a 57M with right lung nodule back for f/u.   - No new respiratory sx.   - Has a 1.1 cm RUL nodule that has been w/u with PET and discussed at nodule conference. The SUV is 2.63. Recently saw thoracic surgery and given that this would be an extensive procedure, plan was to f/u another short-term CT.   - He denies CP, dyspnea, hemoptysis.   - Exposure hx: Denies asbestos or radon exposure   - Tobacco hx: Past Smoker: >60pkyr tobacco history quit 2004   - My interpretation of the images relevant for this visit includes: no interval change in RUL nodule   - My interpretation of the PFT's relevant for this visit includes: Normal      Culprit Nodule(s):   1: Right upper lobe nodule and is 13x8 mm in size/severity and non-calcified in morphology/quality. First seen by chest CT on 2017. There is no interval change.     Other active  medical problems include:   - has DM. Stable on metformin          Past Medical History:      Past Medical History:   Diagnosis Date     Alcohol dependence in remission (H)      Diabetes (H)      Frequent PVCs      History of smoking      Hypertension      Obesity            Past Surgical History:      Past Surgical History:   Procedure Laterality Date     ARTHROSCOPY KNEE  1979     HERNIA REPAIR       patellar tendon repair            Social History:     Social History     Tobacco Use     Smoking status: Former Smoker     Packs/day: 3.00     Years: 33.00     Pack years: 99.00     Types: Cigarettes     Last attempt to quit: 2005     Years since quittin.8     Smokeless tobacco: Never Used   Substance Use Topics     Alcohol use: No          Family History:     Family History   Problem Relation Age of Onset     Heart Failure Mother      Lung Cancer Father      Emphysema Father      No Known Problems Sister      No Known Problems Brother      Unknown/Adopted Maternal Grandmother      Unknown/Adopted Maternal Grandfather      Unknown/Adopted Paternal Grandmother      No Known Problems Sister            Allergies:    No Known Allergies       Medications:     Current Outpatient Medications   Medication Sig     metFORMIN (GLUCOPHAGE) 500 MG tablet Take 500 mg by mouth 2 times daily (with meals)     metoprolol (TOPROL-XL) 25 MG 24 hr tablet Take 25 mg by mouth every morning      ASPIRIN PO Take 81 mg by mouth every morning      No current facility-administered medications for this visit.           Review of Systems:     CONSTITUTIONAL: negative for fever, chills, change in weight  INTEGUMENTARY/SKIN: no rash or obvious new lesions  ENT/MOUTH: no sore throat, new sinus pain or nasal drainage  RESP: see interval history  CV: negative for chest pain, palpitations or peripheral edema  GI: no nausea, vomiting, change in stools  : no dysuria  MUSCULOSKELETAL: no myalgias, arthralgias  ENDOCRINE: blood sugars with  adequate control  PSYCHIATRIC: mood stable  LYMPHATIC: no new lymphadenopathy  HEME: no bleeding or easy bruisability  NEURO: no numbness, weakness, headaches         Physical Exam:     Temp:  [98.2  F (36.8  C)] 98.2  F (36.8  C)  Pulse:  [55] 55  Resp:  [16] 16  BP: (154)/(84) 154/84  SpO2:  [96 %] 96 %  Wt Readings from Last 4 Encounters:   06/11/19 120.8 kg (266 lb 4.8 oz)   09/11/18 107.5 kg (237 lb)   02/22/18 103.6 kg (228 lb 8 oz)   02/22/18 103.6 kg (228 lb 8 oz)     Constitutional:   Awake, alert and in no apparent distress     Eyes:   Nonicteric, RAJAN     ENT:    Trachea is midline. No gross neck abnormalities      Neck:   Supple without supraclavicular or cervical lymphadenopathy     Lungs:   Good air flow.  No crackles. No rhonchi.  No wheezes.     Cardiovascular:   Normal S1 and S2.  RRR.  No murmur, gallop or rub.  Radial, DP and PT pulses normal and symmetric     Abdomen:   NABS, soft, nontender, nondistended.  No HSM.     Musculoskeletal:   No edema.      Neurologic:   Alert and conversant. Cranial nerves  intact.       Skin:   Warm, dry.  No rash on limited exam.           Current Laboratory Data:   All laboratory and imaging data reviewed.    No results found for this or any previous visit (from the past 24 hour(s)).         Previous Pulmonary Function Testing     FVC-Pred   Date Value Ref Range Status   02/22/2018 4.52 L      FVC-Pre   Date Value Ref Range Status   02/22/2018 4.18 L      FVC-%Pred-Pre   Date Value Ref Range Status   02/22/2018 92 %      FEV1-Pre   Date Value Ref Range Status   02/22/2018 2.78 L      FEV1-%Pred-Pre   Date Value Ref Range Status   02/22/2018 78 %      FEV1FVC-Pred   Date Value Ref Range Status   02/22/2018 78 %      FEV1FVC-Pre   Date Value Ref Range Status   02/22/2018 66 %      No results found for: 20029  FEFMax-Pred   Date Value Ref Range Status   02/22/2018 9.10 L/sec      FEFMax-Pre   Date Value Ref Range Status   02/22/2018 6.40 L/sec      FEFMax-%Pred-Pre    Date Value Ref Range Status   02/22/2018 70 %      ExpTime-Pre   Date Value Ref Range Status   02/22/2018 10.64 sec      FIFMax-Pre   Date Value Ref Range Status   02/22/2018 6.06 L/sec      FEV1FEV6-Pred   Date Value Ref Range Status   02/22/2018 79 %      FEV1FEV6-Pre   Date Value Ref Range Status   02/22/2018 69 %             Previous Cardiology Imaging   No results found for this or any previous visit (from the past 8760 hour(s)).

## 2019-06-11 NOTE — NURSING NOTE
"Oncology Rooming Note    June 11, 2019 10:12 AM   Deng Oconnell is a 59 year old male who presents for:    Chief Complaint   Patient presents with     Oncology Clinic Visit     RETURN VISIT; 9 MONTH LUNG NODULE FOLLOW UP; VITALS COMPLETED BY [facility]      Initial Vitals: /84   Pulse 55   Temp 98.2  F (36.8  C) (Oral)   Resp 16   Ht 1.727 m (5' 7.99\")   Wt 120.8 kg (266 lb 4.8 oz)   SpO2 96%   BMI 40.50 kg/m   Estimated body mass index is 40.5 kg/m  as calculated from the following:    Height as of this encounter: 1.727 m (5' 7.99\").    Weight as of this encounter: 120.8 kg (266 lb 4.8 oz). Body surface area is 2.41 meters squared.  No Pain (0) Comment: Data Unavailable   No LMP for male patient.  Allergies reviewed: Yes  Medications reviewed: Yes    Medications: Medication refills not needed today.  Pharmacy name entered into EPIC: [facility]    Clinical concerns: No new concerns today  Dr. Fried was notified.      Crista Malone              "

## 2020-07-01 ENCOUNTER — ANCILLARY PROCEDURE (OUTPATIENT)
Dept: CT IMAGING | Facility: CLINIC | Age: 60
End: 2020-07-01
Attending: INTERNAL MEDICINE
Payer: COMMERCIAL

## 2020-07-01 DIAGNOSIS — R91.8 PULMONARY NODULES: ICD-10-CM

## 2020-07-06 ENCOUNTER — VIRTUAL VISIT (OUTPATIENT)
Dept: PULMONOLOGY | Facility: CLINIC | Age: 60
End: 2020-07-06
Attending: INTERNAL MEDICINE
Payer: COMMERCIAL

## 2020-07-06 DIAGNOSIS — R91.1 LUNG NODULE: Primary | ICD-10-CM

## 2020-07-06 RX ORDER — LISINOPRIL 20 MG/1
TABLET ORAL
COMMUNITY
Start: 2020-06-23

## 2020-07-06 RX ORDER — TAMSULOSIN HYDROCHLORIDE 0.4 MG/1
CAPSULE ORAL
COMMUNITY
Start: 2020-06-23

## 2020-07-06 ASSESSMENT — PAIN SCALES - GENERAL: PAINLEVEL: NO PAIN (0)

## 2020-07-06 NOTE — PROGRESS NOTES
"Deng Oconnell is a 60 year old male who is being evaluated via a billable telephone visit.      The patient has been notified of following:     \"This telephone visit will be conducted via a call between you and your physician/provider. We have found that certain health care needs can be provided without the need for a physical exam.  This service lets us provide the care you need with a short phone conversation.  If a prescription is necessary we can send it directly to your pharmacy.  If lab work is needed we can place an order for that and you can then stop by our lab to have the test done at a later time.    Telephone visits are billed at different rates depending on your insurance coverage. During this emergency period, for some insurers they may be billed the same as an in-person visit.  Please reach out to your insurance provider with any questions.    If during the course of the call the physician/provider feels a telephone visit is not appropriate, you will not be charged for this service.\"    Patient has given verbal consent for Telephone visit?  Yes    What phone number would you like to be contacted at? [phone]    How would you like to obtain your AVS? Mail a copy     Olaf Vincent LPN    60-year-old man with history of mildly PET avid groundglass nodule.  CT scan reviewed and discussed with patient.  The nodule is unchanged.  Per Dr. Fried in his note he recommended another CT scan in 1 year.  The patient recalls being told possibly this could be the last 1.  I will touch base with Dr. Fried before ordering any follow-up.    Deng is otherwise feeling well.  He had no further questions.        Phone call duration: 5 minutes    Blue Francis MD      "

## 2020-07-06 NOTE — LETTER
"7/6/2020       RE: Deng Oconnell  [address] [place] [address] [address] [place] [address]     Dear Colleague,    Thank you for referring your patient, Deng Oconnell, to the [facility] at [facility]. Please see a copy of my visit note below.    Deng Oconnell is a 60 year old male who is being evaluated via a billable telephone visit.      The patient has been notified of following:     \"This telephone visit will be conducted via a call between you and your physician/provider. We have found that certain health care needs can be provided without the need for a physical exam.  This service lets us provide the care you need with a short phone conversation.  If a prescription is necessary we can send it directly to your pharmacy.  If lab work is needed we can place an order for that and you can then stop by our lab to have the test done at a later time.    Telephone visits are billed at different rates depending on your insurance coverage. During this emergency period, for some insurers they may be billed the same as an in-person visit.  Please reach out to your insurance provider with any questions.    If during the course of the call the physician/provider feels a telephone visit is not appropriate, you will not be charged for this service.\"    Patient has given verbal consent for Telephone visit?  Yes    What phone number would you like to be contacted at? [phone]    How would you like to obtain your AVS? Mail a copy     Olaf Vincent LPN    60-year-old man with history of mildly PET avid groundglass nodule.  CT scan reviewed and discussed with patient.  The nodule is unchanged.  Per Dr. Fried in his note he recommended another CT scan in 1 year.  The patient recalls being told possibly this could be the last 1.  I will touch base with Dr. Fried before ordering any follow-up.    Deng is otherwise feeling well.  He had no further questions.        Phone call duration: 5 " minutes    Blue Francis MD        Again, thank you for allowing me to participate in the care of your patient.      Sincerely,    Blue Francis MD

## 2020-07-06 NOTE — LETTER
Date:July 14, 2020      Patient was self referred, no letter generated. Do not send.        [facility] Information

## 2023-01-19 NOTE — PROGRESS NOTES
-Patient stated having generalized pain x 1 day.  -numerous osseous metastases as well as pulmonary nodules likely metastases likely from prostate Ca  -Continue pain management, is currently on oxycontin with good pain control  -titrating pain medication as patient is lethargic while taking current dose of oxycontin, but pain has been inadequately controlled despite being lethargic     Telephone call    Called Mr. Oconnell however went to voicemail.     I reported that the consensus is to proceed with posterior segmentectomy of the RUL nodule. This consensus was formulated by Jose David Rosa, Quinn Patel and myself.     Thoracic surgery will call the patient and set up both clinic appt and repeat PFT.     I left Mr. Oconnell a message to this effect.     Jono Fried MD  Interventional Pulmonary  Division of Pulmonary, Allergy, Critical Care and Sleep Medicine   [phone]

## (undated) RX ORDER — ALBUTEROL SULFATE 0.83 MG/ML
SOLUTION RESPIRATORY (INHALATION)
Status: DISPENSED
Start: 2018-02-22